# Patient Record
Sex: FEMALE | Race: WHITE | NOT HISPANIC OR LATINO | Employment: OTHER
[De-identification: names, ages, dates, MRNs, and addresses within clinical notes are randomized per-mention and may not be internally consistent; named-entity substitution may affect disease eponyms.]

---

## 2017-07-01 ENCOUNTER — HEALTH MAINTENANCE LETTER (OUTPATIENT)
Age: 27
End: 2017-07-01

## 2019-01-06 ENCOUNTER — TRANSFERRED RECORDS (OUTPATIENT)
Dept: HEALTH INFORMATION MANAGEMENT | Facility: CLINIC | Age: 29
End: 2019-01-06

## 2019-05-14 ENCOUNTER — NURSE TRIAGE (OUTPATIENT)
Dept: NURSING | Facility: CLINIC | Age: 29
End: 2019-05-14

## 2019-05-14 NOTE — TELEPHONE ENCOUNTER
"Reports she called to make appt w/ a cardiologist.  States her PCP is Dr. Abdoulaye Ceja \"at  clinic on [address]\". No [facility] clinic in that location and no record of visit since 2015 in [facility] system. Says \"my BP is really high\" but has not taken her BP \"but I can tell\" Sx are palpitations, intermittent SOB (sometimes improves w/asthms rescue inhaler but not always), intermittent chest pain (not currently), red hands and feet. Sx present 2 mos. Takes metoprolol for BP. Pt doubled her dose of this on her own to \"try to get my BP down\". This has not resulted in any improvement. Currently mild SOB, has not used inhaler. Advised ED now - first use inhaler or take on the way to hospital. Ana Garcia RN/FNA      Reason for Disposition    Difficulty breathing    Additional Information    Negative: Passed out (i.e., lost consciousness, collapsed and was not responding)    Negative: Shock suspected (e.g., cold/pale/clammy skin, too weak to stand, low BP, rapid pulse)    Negative: Difficult to awaken or acting confused (e.g., disoriented, slurred speech)    Negative: Visible sweat on face or sweat dripping down face    Negative: Unable to walk, or can only walk with assistance (e.g., requires support)    Negative: [1] Received SHOCK from implantable cardiac defibrillator AND [2] persisting symptoms (i.e., palpitations, lightheadedness)    Negative: Sounds like a life-threatening emergency to the triager    Negative: Chest pain    Protocols used: HEART RATE AND HEARTBEAT [id number]-BY      "

## 2019-12-27 LAB
ABO + RH BLD: NORMAL
ABO + RH BLD: NORMAL
HBV SURFACE AG SERPL QL IA: NONREACTIVE
HIV 1+2 AB+HIV1 P24 AG SERPL QL IA: NONREACTIVE
RUBELLA ANTIBODY IGG QUANTITATIVE: NORMAL IU/ML
TREPONEMA ANTIBODIES: NONREACTIVE

## 2020-01-06 ENCOUNTER — MEDICAL CORRESPONDENCE (OUTPATIENT)
Dept: HEALTH INFORMATION MANAGEMENT | Facility: CLINIC | Age: 30
End: 2020-01-06

## 2020-01-06 ENCOUNTER — TRANSFERRED RECORDS (OUTPATIENT)
Dept: HEALTH INFORMATION MANAGEMENT | Facility: CLINIC | Age: 30
End: 2020-01-06

## 2020-01-07 ENCOUNTER — TELEPHONE (OUTPATIENT)
Dept: MATERNAL FETAL MEDICINE | Facility: CLINIC | Age: 30
End: 2020-01-07

## 2020-01-07 ENCOUNTER — TRANSCRIBE ORDERS (OUTPATIENT)
Dept: MATERNAL FETAL MEDICINE | Facility: CLINIC | Age: 30
End: 2020-01-07

## 2020-01-07 DIAGNOSIS — O26.90 PREGNANCY RELATED CONDITION, ANTEPARTUM: Primary | ICD-10-CM

## 2020-01-07 NOTE — TELEPHONE ENCOUNTER
Spoke with Yamilet today to inquire if she has a Rhuematologist. Pt states she sees someone in [place], RACHELE sent to pt. Informed pt, once we receive records we will call to schedule. Pt VU.   Natali Hastings RN

## 2020-01-08 ENCOUNTER — TRANSCRIBE ORDERS (OUTPATIENT)
Dept: MATERNAL FETAL MEDICINE | Facility: CLINIC | Age: 30
End: 2020-01-08

## 2020-01-08 DIAGNOSIS — O26.90 PREGNANCY RELATED CONDITION, ANTEPARTUM: Primary | ICD-10-CM

## 2020-01-13 ENCOUNTER — PRE VISIT (OUTPATIENT)
Dept: MATERNAL FETAL MEDICINE | Facility: CLINIC | Age: 30
End: 2020-01-13

## 2020-01-14 NOTE — PROGRESS NOTES
"RE: Yamilet Vazquez  : 1990  MRUN: [id number]    2020    Dear Dr. Toscano,    Thank you for referring your patient Ms. Vazquez for a Maternal-Fetal Medicine consultation today.  As you know, she is a 29 year old  at 12 weeks and 0 days gestation with an estimated date of delivery of 2020 by 7 week ultrasound (uncertain LMP).  She came to me today to discuss recommendations as she has SSA antibodies in the setting of Sjogren syndrome.  She also has chronic hypertension and a history of depression with hospitalization for suicidality.  Today she feels well.  She denies contractions, leakage of fluid and vaginal bleeding.      She reports she was diagnosed with Sjogren syndrome about 2 years ago.  As per her, antibody testing was done without her having any symptoms. She was on plaquenil for a brief period of time and has been on nothing since. She reports she was also diagnosed with chronic hypertension two years ago.  She was previously on metoprolol, but is currently on nothing.  She is currently undergoing a work up for palpitations with a Holter monitor having seen a cardiologist at [facility] earlier today.  She has a history of hospitalization in  for suicidal ideation at which time she was 15 years-old as well as ER visits.  She has been on multiple medications in the past and is currently on fluoxetine.  With regards to her asthma she has never been hospitalized and has not used an inhaler for \"a long time.\"  Lastly, she had a seizure disorder when she was child, but has not had any seizures since she was about 14 and has been off medication for years.      Obstetrical History:    # Outcome Date GA Lbr Mane/2nd Weight Sex Delivery Anes PTL Lv   3 Current            2 SAB 19 4w0d          1 TAB 09 17w0d            Gynecological History:  - Reports + HPV, but not the high risk subtype  - Denies any history of abnormal pap smears  - Denies prior cervical surgery " or procedures  - Denies any history of frequent UTIs, vaginal infections, or STIs    Medical History:   Diagnosis Date     Acute otitis media 4/19/2013     ADHD      Anxiety      Asthma      Chronic hypertension      Depressive disorder      History of seizures as a child      Obesity      Psoriasis      Sjogren's syndrome (H)      SS-A antibody positive      Surgical History:   Procedure Laterality Date     AS INDUCED ABORTN BY DIL/EVAC  2009     TONSILLECTOMY, ADENOIDECTOMY, MYRINGOTOMY, INSERT TUBE BILATERAL, COMBINED  1996     Medications:   Fluoxetine 20 mg daily  Prenatal vitamin daily    Allergies:   Allergen Reactions     Flagyl [Metronidazole Hcl]      Swollen tongue hard to breath     Social History:    She  reports that she has quit smoking. She does not drink alcohol or use illicit drugs.    Family History:     She denies a family history of motor/intellectual impairment, stillbirth, genetic or chromosome abnormalities or congenital anomalies.       Review of Systems:    Negative except as stated in the HPI    Data Reviewed:      Current - 139/87    Pre-pregnancy - Height 5 feet 1 inches; Weight: 78.9 kg (174 lb); BMI:32.89 kg/m2    December, 2019  o ABO Group: O, Rh type: positive, antibody screen: negative  o Hemoglobin 14.3 mg/dL, hematocrit 41.8 %, MCV 92 fL, platelets 237 thou/ L  o TPA/HepBsAg/HIV: negative  o Rubella IgG: immune  o Gonorrhea/Chlamydia: negative  o Urine culture: negative  o Pro/Cr: 0.09  o Creatinine: 0.51 mg/dL  o Transaminases: 21/17 U/L    The remaining prenatal laboratory results are not available for the review during the consultation.    Ultrasound:    Yamilet requested a fetal heart rate check which was unsuccessful with the bedside Doptone and therefore an ultrasound was performed    Please see separate report/imaging tab for viability ultrasound performed today    Physical examination was deferred at this time.    In light of the patient s history as listed above my  recommendations can be summarized briefly as follows:    Sjogren Syndrome with SSA Antibodies    Sjogren disease may occur alone or in a secondary form associated with other disorders (such as rheumatoid arthritis).  While it is characterized by exocrine gland dysfunction, it may also involve many other organ systems.  Women with primary Sjogren tend to do very well during pregnancy.  Women with SS-A (Ro) antibodies are at risk for  lupus, characterized by rash and congenital heart block (CHB). The risk of developing heart block in offspring of women with anti-Ro/SSA antibodies in the absence of a prior birth with  lupus syndrome is approximately 1-2% and the risk for  lupus is about 4 to 16 percent in offspring of SSA positive mothers.   lupus can develop regardless of the clinical diagnosis whether it was SLE, Sjogren, or arthritis, as the development is linked to the passage of those antibodies. Presentation can be cutaneous (rash), cardiac (heart block), hepatic (abnormal LFTs), or hematologic (cytopenia).  We discussed the presentation of heart block can be in the form of 1st, 2nd, or 3rd degree block and that it most commonly occurs between 18 and 24 weeks. Prenatal screening programs using the mechanical MS interval have been created to identify heart block early so that the pregnancy management can be altered.      Chronic Hypertension    The concern with chronic hypertension is that such patients are more likely to develop preeclampsia during the pregnancy, with a risk of 20-50%.  Women with chronic hypertension are at increased risk for early-onset preeclampsia.  Low dose aspirin has been used to lower this risk.  Chronic hypertension also increases the risk of maternal stroke, pulmonary edema, renal failure, gestational diabetes, iatrogenic  birth, fetal growth restriction, placental abruption and  mortality rate including stillbirth.  We reviewed that it is  generally anticipated that blood pressure will gradually decrease during early pregnancy, reaching at gabriela at 28-32 weeks, and then slowly rise to pre-pregnancy levels.     Guidelines suggest starting antihypertensive medication in women with chronic hypertension if the systolic blood pressure is persistently 160 mmHg or higher or the diastolic blood pressure is persistently 110 mmHg or higher.  If there is evidence of end organ damage (renal insufficiency, left ventricular hypertrophy or severe thrombocytopenia) a lower threshold of 150 mmHg systolic and/or 100 mm Hg diastolic is recommended. Treatment with antihypertensives is generally used to maintain blood pressure in the general range of 120-160 mmHg systolic and  mmHg diastolic. Lower blood pressures may increase the risk of fetal growth restriction. The primary reason for antihypertensive is to reduce the risk of maternal stroke. Recommended antihypertensives with studied safety profiles in pregnancy include nifedipine and labetalol. Unfortunately, even exquisite control of blood pressure does not reduce the risk of superimposed preeclampsia.     Depression  Approximately 10-15% of women of reproductive age are affected by depression.  Untreated psychiatric disease in pregnancy is serious; approximately 40-50% of untreated patients will have an exacerbation and 15% are at risk for suicidal ideation.  Moreover, untreated depression may increase the risk of stillbirth, IUGR,  delivery, low Apgar scores, sexually transmitted infections, and substance abuse.  In general the risk of uncontrolled psychiatric disease outweighs the risk of medications during pregnancy, and therefore medications should be continued if medically indicated.  There is an extensive literature regarding the risks of medications, particularly SSRIs, in pregnancy.  SSRI s have been linked with an increased risk for primary pulmonary hypertension of the , though the  absolute risk of this disease is still low. Persistent pulmonary hypertension, which occurs in 2 per 1000 live births, occurs in about one per 100 newborns exposed to an SSRI in the second half of pregnancy, possibly related to serotonin-related effects on cardiovascular development.  The reports of  adaptation (or withdrawal) syndrome have not been clarified to this point.       Recommendations:    Every other week assessment for fetal heart block  - 16, 20 and 24 weeks with pediatric cardiology  - 18, 22 and 26 weeks with MFM - growth and anatomy will be assessed at these visits as well     EKG    Notification of pediatrics at delivery      Initiation of necessary medications and titrate as needed to achieve blood pressure goal    Low dose aspirin for preeclampsia prevention, ideally started between 12-16 weeks, Yamilet was instructed to start this    Baseline studies:   o Electrocardiogram, if abnormal this should be followed up with an echocardiogram  o Early GCT    Close monitoring for evidence of superimposed preeclampsia  o Repeat labs as clinically indicated, with a low threshold to rule-out superimposed preeclampsia, especially if it is thought that medication may need to be increased  o Close monitoring of maternal blood pressure in office and with home blood pressure monitoring, the patient's blood pressure cuff should be titrated in the office  o Signs and symptoms of preeclampsia reviewed    Ultrasounds for growth at 30 and 34 weeks with [facility]     fetal surveillance with weekly BPP (or NST and MVP) starting at 32-36 weeks depending on severity of maternal disease    Delivery at 38-39 weeks (unless poorly controlled or otherwise indicated sooner)    Early postpartum visit (within the first week) for blood pressure assessment as well as mood check    Postpartum, medication should be adjusted to maintain the systolic blood pressure below 150 mm Hg and the diastolic blood  pressure below 100 mm Hg    Close monitoring of depressive symptoms and follow up with her psychiatric provider throughout pregnancy.      Monitor total weight gain.      Thank you for allowing us to participate in the care of your patient. Please do not hesitate to contact us if you have further questions regarding the management of your patient.     Seen with and staffed by Dr. Raymond Nascimento MD  Maternal Fetal Medicine Fellow  1/16/2020 9:22 AM      MFM Attending Attestation     At the end of our discussion, Ms. Vazquez indicated that her questions were answered and she seemed satisfied with our discussion.  Thank you for the opportunity to participate in your patient s care.  If I can be of any further assistance, please do not hesitate to contact me.    I have seen and evaluated the patient myself. I reviewed her imaging/labs and chart since the last MFM visit.  I spent a total of 30 minutes face-to-face with Yamilet Vazquez during today's office visit. Over 50% of this time was spent counseling the patient and/or coordinating care regarding SSA antibodies/cHTN in pregnancy. See note for details; I have made the necessary edits/additions.      Radha Andrade MD  , OB/GYN  Maternal-Fetal Medicine  [email or web address]  [phone] (Academic office)  [phone] (Pager)

## 2020-01-16 ENCOUNTER — HOSPITAL ENCOUNTER (OUTPATIENT)
Dept: ULTRASOUND IMAGING | Facility: CLINIC | Age: 30
Discharge: HOME | End: 2020-01-16
Attending: OBSTETRICS & GYNECOLOGY | Admitting: OBSTETRICS & GYNECOLOGY
Payer: COMMERCIAL

## 2020-01-16 ENCOUNTER — OFFICE VISIT (OUTPATIENT)
Dept: MATERNAL FETAL MEDICINE | Facility: CLINIC | Age: 30
End: 2020-01-16
Attending: OBSTETRICS & GYNECOLOGY
Payer: COMMERCIAL

## 2020-01-16 VITALS
SYSTOLIC BLOOD PRESSURE: 139 MMHG | HEART RATE: 92 BPM | DIASTOLIC BLOOD PRESSURE: 87 MMHG | RESPIRATION RATE: 20 BRPM | OXYGEN SATURATION: 98 %

## 2020-01-16 DIAGNOSIS — O10.019 PRE-EXISTING ESSENTIAL HYPERTENSION DURING PREGNANCY, ANTEPARTUM: ICD-10-CM

## 2020-01-16 DIAGNOSIS — O26.90 PREGNANCY RELATED CONDITION, ANTEPARTUM: ICD-10-CM

## 2020-01-16 DIAGNOSIS — O99.340 DEPRESSION AFFECTING PREGNANCY: ICD-10-CM

## 2020-01-16 DIAGNOSIS — O99.212 OBESITY AFFECTING PREGNANCY IN SECOND TRIMESTER: ICD-10-CM

## 2020-01-16 DIAGNOSIS — F32.A DEPRESSION AFFECTING PREGNANCY: ICD-10-CM

## 2020-01-16 DIAGNOSIS — R76.8 SS-A ANTIBODY POSITIVE: Primary | ICD-10-CM

## 2020-01-16 PROCEDURE — 76801 OB US < 14 WKS SINGLE FETUS: CPT

## 2020-01-16 ASSESSMENT — PAIN SCALES - GENERAL: PAINLEVEL: NO PAIN (0)

## 2020-01-16 NOTE — NURSING NOTE
Yamilet here for mFm consult due to preg c/b Sjogren's +SSA Patient asking if she is having an U/S today. Pt very nervous about pregnancy. FHT's attempted to be doptoned and unsuccessful. Viability U/S done and +'s. Dr. Andrade and Dr. Nascimento in to see pt. See consult note. Pt to have echo with peds cards at 16, 20, and 24 weeks. Comp US with MD Intervals at 18, 22, amd 26 weeks. Radha Guerra, RN

## 2020-02-18 ENCOUNTER — HOSPITAL ENCOUNTER (OUTPATIENT)
Dept: CARDIOLOGY | Facility: CLINIC | Age: 30
Discharge: HOME | End: 2020-02-18
Attending: OBSTETRICS & GYNECOLOGY | Admitting: OBSTETRICS & GYNECOLOGY
Payer: COMMERCIAL

## 2020-02-18 DIAGNOSIS — R76.8 SS-A ANTIBODY POSITIVE: ICD-10-CM

## 2020-02-18 DIAGNOSIS — O26.90 PREGNANCY RELATED CONDITION, ANTEPARTUM: ICD-10-CM

## 2020-02-18 PROCEDURE — 76825 ECHO EXAM OF FETAL HEART: CPT

## 2020-02-18 NOTE — PROGRESS NOTES
[facility]   Heart Center Fetal Consult Note    Patient:  Yamilet Vazquez MRN:  [id number]   YOB: 1990 Age:  29 year old   Date of Visit:  2020 PCP:  Abdoulaye Ceja MD     Dear Dr. Andrade:    I had the pleasure of seeing Yamilet Vazquez at the [facility] on 2020 in [facility] fetal cardiology consultation for fetal echocardiogram. She presented today accompanied by herself. As you know, she is a 29 year old  at 16w5d who presented for fetal echocardiogram today because of +SSA antibodies.    I performed and interpreted the fetal echocardiogram today, which demonstrated likely normal fetal echocardiogram. Normal fetal intracardiac connections. Normal right and left ventricular size and function. Normal [place] interval. Fetal heart rate is regular at 150 bpm. No right or left ventricular MAGNO. No evidence of hydrops. The aortic isthmus was not well visualized on this study; no indirect evidence of coarctation. Difficult study due to fetal position and poor imaging windows.     I reviewed the echo findings today with Yamilet Vazquez. The results of the fetal echocardiogram were explained. Although this was a technically difficult study, the patient is aware that no obvious cardiac abnormalities were identified. She is aware of the general limitations of fetal echocardiography. A follow-up fetal echocardiogram is scheduled on 3- due to SSA+ antibodies . Post- EKG is recommended.     Plan:    1. Follow up Fetal Echo on 3/13/2020 already scheduled  2.  EKG is recommended.     Thank you for allowing me to participate in Yamilet's care. Please do not hesitate to contact me with questions or concerns.    This visit was separate from the performance and interpretation of the ultrasound. The majority of the time (>50%) was spent in counseling and coordination of care. I spent approximately 15 minutes in face-to-face  time reviewing the above considerations.    Jacobo Koehler M.D.  Pediatric Cardiology  [facility] [id number] [facility], 5th floor, [facility] [address]  Phone [phone]  Fax [phone]

## 2020-02-27 ENCOUNTER — HOSPITAL ENCOUNTER (OUTPATIENT)
Dept: ULTRASOUND IMAGING | Facility: CLINIC | Age: 30
Discharge: HOME | End: 2020-02-27
Attending: OBSTETRICS & GYNECOLOGY | Admitting: OBSTETRICS & GYNECOLOGY
Payer: COMMERCIAL

## 2020-02-27 ENCOUNTER — OFFICE VISIT (OUTPATIENT)
Dept: MATERNAL FETAL MEDICINE | Facility: CLINIC | Age: 30
End: 2020-02-27
Attending: OBSTETRICS & GYNECOLOGY
Payer: COMMERCIAL

## 2020-02-27 DIAGNOSIS — R76.8 SS-A ANTIBODY POSITIVE: ICD-10-CM

## 2020-02-27 DIAGNOSIS — O09.899 SINGLE UMBILICAL ARTERY AFFECTING MANAGEMENT OF MOTHER IN SINGLETON PREGNANCY, ANTEPARTUM: Primary | ICD-10-CM

## 2020-02-27 DIAGNOSIS — O26.90 PREGNANCY RELATED CONDITION, ANTEPARTUM: ICD-10-CM

## 2020-02-27 PROCEDURE — 76811 OB US DETAILED SNGL FETUS: CPT

## 2020-02-27 NOTE — PROGRESS NOTES
Please see full imaging report from View[employer] program under imaging tab.    Mamadou French MD  Maternal Fetal Medicine

## 2020-03-13 ENCOUNTER — HOSPITAL ENCOUNTER (OUTPATIENT)
Dept: CARDIOLOGY | Facility: CLINIC | Age: 30
Discharge: HOME | End: 2020-03-13
Attending: OBSTETRICS & GYNECOLOGY | Admitting: OBSTETRICS & GYNECOLOGY
Payer: COMMERCIAL

## 2020-03-13 DIAGNOSIS — O26.90 PREGNANCY RELATED CONDITION, ANTEPARTUM: ICD-10-CM

## 2020-03-13 DIAGNOSIS — R76.8 SS-A ANTIBODY POSITIVE: ICD-10-CM

## 2020-03-13 PROCEDURE — 76826 ECHO EXAM OF FETAL HEART: CPT

## 2020-03-26 ENCOUNTER — HOSPITAL ENCOUNTER (OUTPATIENT)
Dept: ULTRASOUND IMAGING | Facility: CLINIC | Age: 30
End: 2020-03-26
Attending: OBSTETRICS & GYNECOLOGY
Payer: COMMERCIAL

## 2020-03-26 ENCOUNTER — OFFICE VISIT (OUTPATIENT)
Dept: MATERNAL FETAL MEDICINE | Facility: CLINIC | Age: 30
End: 2020-03-26
Attending: OBSTETRICS & GYNECOLOGY
Payer: COMMERCIAL

## 2020-03-26 DIAGNOSIS — O26.90 PREGNANCY, ANTEPARTUM, COMPLICATIONS: Primary | ICD-10-CM

## 2020-03-26 DIAGNOSIS — R76.8 SS-A ANTIBODY POSITIVE: ICD-10-CM

## 2020-03-26 DIAGNOSIS — O26.90 PREGNANCY RELATED CONDITION, ANTEPARTUM: ICD-10-CM

## 2020-03-26 DIAGNOSIS — O10.019 PRE-EXISTING ESSENTIAL HYPERTENSION DURING PREGNANCY, ANTEPARTUM: ICD-10-CM

## 2020-03-26 PROCEDURE — 76816 OB US FOLLOW-UP PER FETUS: CPT

## 2020-03-26 NOTE — PROGRESS NOTES
"Please see \"Imaging\" tab under \"Chart Review\" for details of today's visit.    Randell Gifford    "

## 2020-03-26 NOTE — NURSING NOTE
Patient presents to [facility] for Growth US secondary to Sjogren's, 2VC and CHTN. When discussing BP's, patient states she hasn't had it checked in a while and hasn't been to her OB in over a month. We discussed having regular OB visits with her primary care and we would continue to follow her for US's. Patient understands plan and was encouraged to call primary for OB appointment. Patient is scheduled for follow up fetal echo with Peds Cards on 4/10/20 and RL2 with M on 4/23/20.    Marcia Morton RN    Routed to primary OB.

## 2020-04-03 ENCOUNTER — TELEPHONE (OUTPATIENT)
Dept: PEDIATRIC CARDIOLOGY | Facility: CLINIC | Age: 30
End: 2020-04-03

## 2020-04-03 NOTE — TELEPHONE ENCOUNTER
Left VM for patient to call back to discuss upcoming fetal appointment on 4/10. Fetal cardiology is recommending postponing 2-3 weeks. Awaiting call back.

## 2020-04-23 ENCOUNTER — HOSPITAL ENCOUNTER (OUTPATIENT)
Dept: ULTRASOUND IMAGING | Facility: CLINIC | Age: 30
End: 2020-04-23
Attending: OBSTETRICS & GYNECOLOGY
Payer: COMMERCIAL

## 2020-04-23 ENCOUNTER — OFFICE VISIT (OUTPATIENT)
Dept: MATERNAL FETAL MEDICINE | Facility: CLINIC | Age: 30
End: 2020-04-23
Attending: OBSTETRICS & GYNECOLOGY
Payer: COMMERCIAL

## 2020-04-23 DIAGNOSIS — O09.899 SINGLE UMBILICAL ARTERY AFFECTING MANAGEMENT OF MOTHER IN SINGLETON PREGNANCY, ANTEPARTUM: Primary | ICD-10-CM

## 2020-04-23 DIAGNOSIS — R76.8 SS-A ANTIBODY POSITIVE: ICD-10-CM

## 2020-04-23 DIAGNOSIS — O26.90 PREGNANCY RELATED CONDITION, ANTEPARTUM: ICD-10-CM

## 2020-04-23 PROCEDURE — 76816 OB US FOLLOW-UP PER FETUS: CPT

## 2020-05-21 ENCOUNTER — TRANSCRIBE ORDERS (OUTPATIENT)
Dept: MATERNAL FETAL MEDICINE | Facility: CLINIC | Age: 30
End: 2020-05-21

## 2020-05-21 DIAGNOSIS — O26.90 PREGNANCY RELATED CONDITION: Primary | ICD-10-CM

## 2020-05-26 ENCOUNTER — OFFICE VISIT (OUTPATIENT)
Dept: MATERNAL FETAL MEDICINE | Facility: CLINIC | Age: 30
End: 2020-05-26
Attending: OBSTETRICS & GYNECOLOGY
Payer: COMMERCIAL

## 2020-05-26 ENCOUNTER — HOSPITAL ENCOUNTER (OUTPATIENT)
Dept: ULTRASOUND IMAGING | Facility: CLINIC | Age: 30
End: 2020-05-26
Attending: OBSTETRICS & GYNECOLOGY
Payer: COMMERCIAL

## 2020-05-26 DIAGNOSIS — O09.899 SINGLE UMBILICAL ARTERY AFFECTING MANAGEMENT OF MOTHER IN SINGLETON PREGNANCY, ANTEPARTUM: Primary | ICD-10-CM

## 2020-05-26 DIAGNOSIS — O26.90 PREGNANCY RELATED CONDITION: ICD-10-CM

## 2020-05-26 DIAGNOSIS — O10.919 CHRONIC HYPERTENSION AFFECTING PREGNANCY: ICD-10-CM

## 2020-05-26 DIAGNOSIS — O26.90 PREGNANCY, ANTEPARTUM, COMPLICATIONS: ICD-10-CM

## 2020-05-26 PROCEDURE — 76816 OB US FOLLOW-UP PER FETUS: CPT

## 2020-06-01 ENCOUNTER — HOSPITAL ENCOUNTER (OUTPATIENT)
Facility: CLINIC | Age: 30
End: 2020-06-01
Admitting: OBSTETRICS & GYNECOLOGY
Payer: COMMERCIAL

## 2020-06-01 ENCOUNTER — HOSPITAL ENCOUNTER (OUTPATIENT)
Facility: CLINIC | Age: 30
Discharge: HOME | End: 2020-06-01
Attending: OBSTETRICS & GYNECOLOGY | Admitting: OBSTETRICS & GYNECOLOGY
Payer: COMMERCIAL

## 2020-06-01 VITALS
BODY MASS INDEX: 37.76 KG/M2 | WEIGHT: 200 LBS | SYSTOLIC BLOOD PRESSURE: 108 MMHG | RESPIRATION RATE: 18 BRPM | HEIGHT: 61 IN | TEMPERATURE: 97.5 F | DIASTOLIC BLOOD PRESSURE: 73 MMHG

## 2020-06-01 PROBLEM — Z36.89 ENCOUNTER FOR TRIAGE IN PREGNANT PATIENT: Status: ACTIVE | Noted: 2020-06-01

## 2020-06-01 LAB
ALT SERPL W P-5'-P-CCNC: 22 U/L (ref 0–50)
AST SERPL W P-5'-P-CCNC: 21 U/L (ref 0–45)
CREAT SERPL-MCNC: 0.42 MG/DL (ref 0.52–1.04)
CREAT UR-MCNC: 44 MG/DL
ERYTHROCYTE [DISTWIDTH] IN BLOOD BY AUTOMATED COUNT: 13 % (ref 10–15)
GFR SERPL CREATININE-BSD FRML MDRD: >90 ML/MIN/{1.73_M2}
HCT VFR BLD AUTO: 33.1 % (ref 35–47)
HGB BLD-MCNC: 11.3 G/DL (ref 11.7–15.7)
MCH RBC QN AUTO: 31.1 PG (ref 26.5–33)
MCHC RBC AUTO-ENTMCNC: 34.1 G/DL (ref 31.5–36.5)
MCV RBC AUTO: 91 FL (ref 78–100)
PLATELET # BLD AUTO: 208 10E9/L (ref 150–450)
PROT UR-MCNC: 0.13 G/L
PROT/CREAT 24H UR: 0.29 G/G CR (ref 0–0.2)
RBC # BLD AUTO: 3.63 10E12/L (ref 3.8–5.2)
WBC # BLD AUTO: 6.3 10E9/L (ref 4–11)

## 2020-06-01 PROCEDURE — 84460 ALANINE AMINO (ALT) (SGPT): CPT | Performed by: MIDWIFE

## 2020-06-01 PROCEDURE — 85027 COMPLETE CBC AUTOMATED: CPT | Performed by: MIDWIFE

## 2020-06-01 PROCEDURE — 36415 COLL VENOUS BLD VENIPUNCTURE: CPT | Performed by: MIDWIFE

## 2020-06-01 PROCEDURE — G0463 HOSPITAL OUTPT CLINIC VISIT: HCPCS | Mod: 25

## 2020-06-01 PROCEDURE — 84156 ASSAY OF PROTEIN URINE: CPT | Performed by: MIDWIFE

## 2020-06-01 PROCEDURE — 59025 FETAL NON-STRESS TEST: CPT

## 2020-06-01 PROCEDURE — 82565 ASSAY OF CREATININE: CPT | Performed by: MIDWIFE

## 2020-06-01 PROCEDURE — 84450 TRANSFERASE (AST) (SGOT): CPT | Performed by: MIDWIFE

## 2020-06-01 ASSESSMENT — MIFFLIN-ST. JEOR: SCORE: 1559.57

## 2020-06-01 NOTE — DISCHARGE INSTRUCTIONS
Discharge Instruction for Undelivered Patients      You were seen for: Fetal Assessment and blood pressure check  We Consulted: Dr Ibarra  You had (Test or Medicine):labs, fetal monitoring     Diet:   Drink 8 to 12 glasses of liquids (milk, juice, water) every day.  You may eat meals and snacks.     Activity:  Call your doctor or nurse midwife if your baby is moving less than usual.     Call your provider if you notice:  Swelling in your face or increased swelling in your hands or legs.  Headaches that are not relieved by Tylenol (acetaminophen).  Changes in your vision (blurring: seeing spots or stars.)  Nausea (sick to your stomach) and vomiting (throwing up).   Weight gain of 5 pounds or more per week.  Heartburn that doesn't go away.  Signs of bladder infection: pain when you urinate (use the toilet), need to go more often and more urgently.  The bag of jalloh (rupture of membranes) breaks, or you notice leaking in your underwear.  Bright red blood in your underwear.  Abdominal (lower belly) or stomach pain.  For first baby: Contractions (tightening) less than 5 minutes apart for one hour or more.  Second (plus) baby: Contractions (tightening) less than 10 minutes apart and getting stronger.  *If less than 34 weeks: Contractions (tightenings) more than 6 times in one hour.  Increase or change in vaginal discharge (note the color and amount)  Other: Please follow up in clinic on Thursday, June 4th.    Follow-up:  Make an appointment to be seen on Thurs, June 4th

## 2020-06-01 NOTE — PLAN OF CARE
Pt is  who arrived due to decreased FM and stated that her blood p[ressure was elevated at home, 150's/80's.  Pt stated that she has a headache but also has been having tension headaches throughout pregnancy.  Feels the same.  Denies epigastric pain, blurred vision or any other symptoms.  No Leaking of fluid per pt.    Monitors placed after obtaining verbal consent.   BP's 110-120/80's. Pt oriented to room and given call light.  Urine sample obtained.  Dr Castillo notified of pt status. Awaiting lab results.   Will continue to monitor.

## 2020-06-01 NOTE — PLAN OF CARE
Pt discharged to home undelivered.  Discharge instructions given and pt voiced understanding.  Pt to follow up in clinic on Thursday per Dr Castillo but pt states she has an appointment tomorrow that she will keep.  All questions answered and pt left ambulatory.

## 2020-06-01 NOTE — PLAN OF CARE
Dr Castillo notified of pt lab results and category 1 tracing.  Discharge to home per Dr Castillo.

## 2020-07-01 LAB — GROUP B STREP PCR: NEGATIVE

## 2020-07-14 DIAGNOSIS — Z34.90 ENCOUNTER FOR INDUCTION OF LABOR: ICD-10-CM

## 2020-07-14 PROCEDURE — U0003 INFECTIOUS AGENT DETECTION BY NUCLEIC ACID (DNA OR RNA); SEVERE ACUTE RESPIRATORY SYNDROME CORONAVIRUS 2 (SARS-COV-2) (CORONAVIRUS DISEASE [COVID-19]), AMPLIFIED PROBE TECHNIQUE, MAKING USE OF HIGH THROUGHPUT TECHNOLOGIES AS DESCRIBED BY CMS-2020-01-R: HCPCS | Performed by: OBSTETRICS & GYNECOLOGY

## 2020-07-14 PROCEDURE — 99207 ZZC NO BILLABLE SERVICE THIS VISIT: CPT | Performed by: OBSTETRICS & GYNECOLOGY

## 2020-07-15 LAB
SARS-COV-2 RNA SPEC QL NAA+PROBE: NOT DETECTED
SPECIMEN SOURCE: NORMAL

## 2020-07-16 ENCOUNTER — HOSPITAL ENCOUNTER (INPATIENT)
Facility: CLINIC | Age: 30
LOS: 5 days | Discharge: HOME | End: 2020-07-21
Attending: OBSTETRICS & GYNECOLOGY | Admitting: OBSTETRICS & GYNECOLOGY
Payer: COMMERCIAL

## 2020-07-16 DIAGNOSIS — Z34.90 ENCOUNTER FOR INDUCTION OF LABOR: Primary | ICD-10-CM

## 2020-07-16 LAB
ALT SERPL W P-5'-P-CCNC: 17 U/L (ref 0–50)
ANION GAP SERPL CALCULATED.3IONS-SCNC: 10 MMOL/L (ref 3–14)
AST SERPL W P-5'-P-CCNC: 23 U/L (ref 0–45)
BASOPHILS # BLD AUTO: 0 10E9/L (ref 0–0.2)
BASOPHILS NFR BLD AUTO: 0.2 %
BUN SERPL-MCNC: 6 MG/DL (ref 7–30)
CALCIUM SERPL-MCNC: 8.3 MG/DL (ref 8.5–10.1)
CHLORIDE SERPL-SCNC: 105 MMOL/L (ref 94–109)
CO2 SERPL-SCNC: 22 MMOL/L (ref 20–32)
CREAT SERPL-MCNC: 0.52 MG/DL (ref 0.52–1.04)
DIFFERENTIAL METHOD BLD: NORMAL
EOSINOPHIL # BLD AUTO: 0 10E9/L (ref 0–0.7)
EOSINOPHIL NFR BLD AUTO: 0 %
ERYTHROCYTE [DISTWIDTH] IN BLOOD BY AUTOMATED COUNT: 13.4 % (ref 10–15)
GFR SERPL CREATININE-BSD FRML MDRD: >90 ML/MIN/{1.73_M2}
GLUCOSE SERPL-MCNC: 69 MG/DL (ref 70–99)
HCT VFR BLD AUTO: 35.8 % (ref 35–47)
HGB BLD-MCNC: 11.9 G/DL (ref 11.7–15.7)
IMM GRANULOCYTES # BLD: 0.1 10E9/L (ref 0–0.4)
IMM GRANULOCYTES NFR BLD: 1.1 %
LYMPHOCYTES # BLD AUTO: 2.1 10E9/L (ref 0.8–5.3)
LYMPHOCYTES NFR BLD AUTO: 25 %
MCH RBC QN AUTO: 31.3 PG (ref 26.5–33)
MCHC RBC AUTO-ENTMCNC: 33.2 G/DL (ref 31.5–36.5)
MCV RBC AUTO: 94 FL (ref 78–100)
MONOCYTES # BLD AUTO: 0.8 10E9/L (ref 0–1.3)
MONOCYTES NFR BLD AUTO: 9.2 %
NEUTROPHILS # BLD AUTO: 5.3 10E9/L (ref 1.6–8.3)
NEUTROPHILS NFR BLD AUTO: 64.5 %
NRBC # BLD AUTO: 0 10*3/UL
NRBC BLD AUTO-RTO: 0 /100
PLATELET # BLD AUTO: 188 10E9/L (ref 150–450)
POTASSIUM SERPL-SCNC: 3.1 MMOL/L (ref 3.4–5.3)
RBC # BLD AUTO: 3.8 10E12/L (ref 3.8–5.2)
SODIUM SERPL-SCNC: 137 MMOL/L (ref 133–144)
WBC # BLD AUTO: 8.2 10E9/L (ref 4–11)

## 2020-07-16 PROCEDURE — 84460 ALANINE AMINO (ALT) (SGPT): CPT | Performed by: OBSTETRICS & GYNECOLOGY

## 2020-07-16 PROCEDURE — 82565 ASSAY OF CREATININE: CPT | Performed by: OBSTETRICS & GYNECOLOGY

## 2020-07-16 PROCEDURE — 85025 COMPLETE CBC W/AUTO DIFF WBC: CPT | Performed by: OBSTETRICS & GYNECOLOGY

## 2020-07-16 PROCEDURE — 86870 RBC ANTIBODY IDENTIFICATION: CPT | Performed by: OBSTETRICS & GYNECOLOGY

## 2020-07-16 PROCEDURE — 86850 RBC ANTIBODY SCREEN: CPT | Performed by: OBSTETRICS & GYNECOLOGY

## 2020-07-16 PROCEDURE — 86900 BLOOD TYPING SEROLOGIC ABO: CPT | Performed by: OBSTETRICS & GYNECOLOGY

## 2020-07-16 PROCEDURE — 12000000 ZZH R&B MED SURG/OB

## 2020-07-16 PROCEDURE — 86901 BLOOD TYPING SEROLOGIC RH(D): CPT | Performed by: OBSTETRICS & GYNECOLOGY

## 2020-07-16 PROCEDURE — 84450 TRANSFERASE (AST) (SGOT): CPT | Performed by: OBSTETRICS & GYNECOLOGY

## 2020-07-16 PROCEDURE — 25000132 ZZH RX MED GY IP 250 OP 250 PS 637: Performed by: OBSTETRICS & GYNECOLOGY

## 2020-07-16 PROCEDURE — 86780 TREPONEMA PALLIDUM: CPT | Performed by: OBSTETRICS & GYNECOLOGY

## 2020-07-16 PROCEDURE — 36415 COLL VENOUS BLD VENIPUNCTURE: CPT | Performed by: OBSTETRICS & GYNECOLOGY

## 2020-07-16 PROCEDURE — 3E0P7VZ INTRODUCTION OF HORMONE INTO FEMALE REPRODUCTIVE, VIA NATURAL OR ARTIFICIAL OPENING: ICD-10-PCS | Performed by: OBSTETRICS & GYNECOLOGY

## 2020-07-16 PROCEDURE — 80048 BASIC METABOLIC PNL TOTAL CA: CPT | Performed by: OBSTETRICS & GYNECOLOGY

## 2020-07-16 RX ORDER — OXYTOCIN/0.9 % SODIUM CHLORIDE 30/500 ML
100-340 PLASTIC BAG, INJECTION (ML) INTRAVENOUS CONTINUOUS PRN
Status: COMPLETED | OUTPATIENT
Start: 2020-07-16 | End: 2020-07-19

## 2020-07-16 RX ORDER — CARBOPROST TROMETHAMINE 250 UG/ML
250 INJECTION, SOLUTION INTRAMUSCULAR
Status: DISCONTINUED | OUTPATIENT
Start: 2020-07-16 | End: 2020-07-19

## 2020-07-16 RX ORDER — ONDANSETRON 2 MG/ML
4 INJECTION INTRAMUSCULAR; INTRAVENOUS EVERY 6 HOURS PRN
Status: DISCONTINUED | OUTPATIENT
Start: 2020-07-16 | End: 2020-07-19

## 2020-07-16 RX ORDER — FLUOXETINE 10 MG/1
20 CAPSULE ORAL DAILY
COMMUNITY

## 2020-07-16 RX ORDER — FENTANYL CITRATE 50 UG/ML
50-100 INJECTION, SOLUTION INTRAMUSCULAR; INTRAVENOUS
Status: DISCONTINUED | OUTPATIENT
Start: 2020-07-16 | End: 2020-07-19

## 2020-07-16 RX ORDER — ASPIRIN 81 MG/1
81 TABLET ORAL DAILY
Status: ON HOLD | COMMUNITY
End: 2020-07-21

## 2020-07-16 RX ORDER — ACETAMINOPHEN 325 MG/1
650 TABLET ORAL EVERY 4 HOURS PRN
Status: DISCONTINUED | OUTPATIENT
Start: 2020-07-16 | End: 2020-07-19

## 2020-07-16 RX ORDER — HYDROXYZINE HYDROCHLORIDE 50 MG/1
100 TABLET, FILM COATED ORAL
Status: COMPLETED | OUTPATIENT
Start: 2020-07-16 | End: 2020-07-16

## 2020-07-16 RX ORDER — SODIUM CHLORIDE, SODIUM LACTATE, POTASSIUM CHLORIDE, CALCIUM CHLORIDE 600; 310; 30; 20 MG/100ML; MG/100ML; MG/100ML; MG/100ML
INJECTION, SOLUTION INTRAVENOUS CONTINUOUS
Status: DISCONTINUED | OUTPATIENT
Start: 2020-07-16 | End: 2020-07-19

## 2020-07-16 RX ORDER — TRANEXAMIC ACID 10 MG/ML
1 INJECTION, SOLUTION INTRAVENOUS EVERY 30 MIN PRN
Status: DISCONTINUED | OUTPATIENT
Start: 2020-07-16 | End: 2020-07-19

## 2020-07-16 RX ORDER — METHYLERGONOVINE MALEATE 0.2 MG/ML
200 INJECTION INTRAVENOUS
Status: DISCONTINUED | OUTPATIENT
Start: 2020-07-16 | End: 2020-07-19

## 2020-07-16 RX ORDER — IBUPROFEN 800 MG/1
800 TABLET, FILM COATED ORAL
Status: DISCONTINUED | OUTPATIENT
Start: 2020-07-16 | End: 2020-07-19

## 2020-07-16 RX ORDER — NALOXONE HYDROCHLORIDE 0.4 MG/ML
.1-.4 INJECTION, SOLUTION INTRAMUSCULAR; INTRAVENOUS; SUBCUTANEOUS
Status: DISCONTINUED | OUTPATIENT
Start: 2020-07-16 | End: 2020-07-19

## 2020-07-16 RX ORDER — MISOPROSTOL 100 UG/1
25 TABLET ORAL
Status: DISCONTINUED | OUTPATIENT
Start: 2020-07-16 | End: 2020-07-19

## 2020-07-16 RX ORDER — OXYCODONE AND ACETAMINOPHEN 5; 325 MG/1; MG/1
1 TABLET ORAL
Status: DISCONTINUED | OUTPATIENT
Start: 2020-07-16 | End: 2020-07-19

## 2020-07-16 RX ORDER — OXYTOCIN 10 [USP'U]/ML
10 INJECTION, SOLUTION INTRAMUSCULAR; INTRAVENOUS
Status: DISCONTINUED | OUTPATIENT
Start: 2020-07-16 | End: 2020-07-19

## 2020-07-16 RX ADMIN — Medication 25 MCG: at 21:44

## 2020-07-16 RX ADMIN — Medication 25 MCG: at 23:50

## 2020-07-16 RX ADMIN — HYDROXYZINE HYDROCHLORIDE 100 MG: 50 TABLET, FILM COATED ORAL at 23:50

## 2020-07-16 ASSESSMENT — MIFFLIN-ST. JEOR: SCORE: 1618.54

## 2020-07-17 ENCOUNTER — ANESTHESIA (OUTPATIENT)
Dept: OBGYN | Facility: CLINIC | Age: 30
End: 2020-07-17
Payer: COMMERCIAL

## 2020-07-17 ENCOUNTER — ANESTHESIA EVENT (OUTPATIENT)
Dept: OBGYN | Facility: CLINIC | Age: 30
End: 2020-07-17
Payer: COMMERCIAL

## 2020-07-17 LAB
ABO + RH BLD: ABNORMAL
ABO + RH BLD: ABNORMAL
BLD GP AB INVEST PLASRBC-IMP: ABNORMAL
BLD GP AB SCN SERPL QL: ABNORMAL
BLOOD BANK CMNT PATIENT-IMP: ABNORMAL
SPECIMEN EXP DATE BLD: ABNORMAL
T PALLIDUM AB SER QL: NONREACTIVE

## 2020-07-17 PROCEDURE — 25000132 ZZH RX MED GY IP 250 OP 250 PS 637: Performed by: OBSTETRICS & GYNECOLOGY

## 2020-07-17 PROCEDURE — 37000011 ZZH ANESTHESIA WARD SERVICE

## 2020-07-17 PROCEDURE — 40000671 ZZH STATISTIC ANESTHESIA CASE

## 2020-07-17 PROCEDURE — 12000000 ZZH R&B MED SURG/OB

## 2020-07-17 RX ORDER — EPHEDRINE SULFATE 50 MG/ML
5 INJECTION, SOLUTION INTRAMUSCULAR; INTRAVENOUS; SUBCUTANEOUS
Status: DISCONTINUED | OUTPATIENT
Start: 2020-07-17 | End: 2020-07-19

## 2020-07-17 RX ORDER — LIDOCAINE HYDROCHLORIDE AND EPINEPHRINE 15; 5 MG/ML; UG/ML
3 INJECTION, SOLUTION EPIDURAL
Status: DISCONTINUED | OUTPATIENT
Start: 2020-07-17 | End: 2020-07-19

## 2020-07-17 RX ORDER — HYDROXYZINE HYDROCHLORIDE 50 MG/1
50 TABLET, FILM COATED ORAL ONCE
Status: COMPLETED | OUTPATIENT
Start: 2020-07-17 | End: 2020-07-17

## 2020-07-17 RX ORDER — NALOXONE HYDROCHLORIDE 0.4 MG/ML
.1-.4 INJECTION, SOLUTION INTRAMUSCULAR; INTRAVENOUS; SUBCUTANEOUS
Status: DISCONTINUED | OUTPATIENT
Start: 2020-07-17 | End: 2020-07-19

## 2020-07-17 RX ORDER — NALBUPHINE HYDROCHLORIDE 20 MG/ML
2.5-5 INJECTION, SOLUTION INTRAMUSCULAR; INTRAVENOUS; SUBCUTANEOUS EVERY 6 HOURS PRN
Status: DISCONTINUED | OUTPATIENT
Start: 2020-07-17 | End: 2020-07-19

## 2020-07-17 RX ADMIN — Medication 25 MCG: at 01:51

## 2020-07-17 RX ADMIN — DINOPROSTONE 10 MG: 10 INSERT VAGINAL at 16:56

## 2020-07-17 RX ADMIN — Medication 25 MCG: at 06:00

## 2020-07-17 RX ADMIN — Medication 25 MCG: at 08:07

## 2020-07-17 RX ADMIN — Medication 25 MCG: at 03:48

## 2020-07-17 RX ADMIN — Medication 25 MCG: at 13:59

## 2020-07-17 RX ADMIN — HYDROXYZINE HYDROCHLORIDE 50 MG: 50 TABLET, FILM COATED ORAL at 23:20

## 2020-07-17 NOTE — PROVIDER NOTIFICATION
07/16/20 2115   Provider Notification   Provider Name/Title Dr. Salazar   Method of Notification Phone   Request Evaluate - Remote   Notification Reason Patient Arrived

## 2020-07-17 NOTE — PLAN OF CARE
Flako frequently, feels low abdominal cramping which radiates to her back, she prefers to be in sitting position for comfort

## 2020-07-17 NOTE — PLAN OF CARE
Patient rested well this night. VSS. Received 5 doses of PO cytotec and took 100mg Vistaril for sleep. Patient reports some mild cramping but declines need for pain medication. Cx 2-5 min apart. EFM Category 1 all shift. Appears comfortable. Plan to go ahead with induction today.

## 2020-07-17 NOTE — PLAN OF CARE
Problem: Change in Fetal Wellbeing (Labor)  Goal: Stable Fetal Wellbeing  7/17/2020 0622 by Carol Quiroz, RN  Outcome: No Change   FHR Category 1

## 2020-07-17 NOTE — H&P
"OB Brief Admit H&P    No significant change in general health status based on examination of the patient, review of Nursing Admission Database and prenatal record.    Pt is a 29 year old  @ 38w1d who presented to L&D for IOL due to CHTN.    Patient's prenatal course has been complicated by Hx of depression, Anti SSA positive, Rh neg, CHTN, asthma, failed early 1 hr GCT, normal 3 hour, passed 3rd tri 1 hr    Prenatal Labs:    Blood type O neg  Rubella immune    GBS neg    EFW: 7#    /59   Pulse 93   Temp 98.1  F (36.7  C) (Oral)   Resp 15   Ht 1.549 m (5' 1\")   Wt 96.6 kg (213 lb)   LMP 10/21/2019   BMI 40.25 kg/m    EFM:  Baseline 140 bpm, moderate variability, accels present, no decels  [place]: ctx q 2-4 min  SVE: 0/40/-3 per RN on admit  Membranes: intact    Assessment:  29 year old  @ 38w1d admitted for IOL for CHTN.    Labor: Cervical ripening due to unfavorable cervix overnight, s/p 6 doses PO cytotec  - Plan for AROM, pit once favorable  - Pain: planning epidural    FWB: Category I reactive  - 2 vessel cord   - continuous fetal monitoring    CHTN: baseline preE labs on admit WNL  - continue to monitor    Anti SSA positive: notify peds after delivery, will need  EKG    Prenatal:  Rh neg, will need Rhogam eval postpartum  GBS negative  Hx of depression - 2 weeks postpartum mood check  ASCUS/+HR HPV - needs colpo postpartum    Elysia Green MD  2020  7:58 AM      "

## 2020-07-17 NOTE — PLAN OF CARE
Patient arrived to L and D unit at 1945 for cervical ripening.  Denies leakage of fluid, vaginal bleeding, headache, epigastric pain, visual disturbances.  Fetal movement present.  External monitors applied.  Physical assessment and health history taken.  Admission questions answered and bill of rights reviewed.  PLAN:  Orders for cervical ripening, intrapartum admission from Dr. Salazar.

## 2020-07-17 NOTE — PLAN OF CARE
12:00 Eating and drinking adequate amounts, positioning in bed up to bathroom every 1-2 hours to void, feels like bad 'gas' pains, aching pains wrap around to her back, Category 1 tracing  1300 Has been able to doze on and off,drinking fluids adequately continues to contract every 2-3 minutes lasting a monute

## 2020-07-18 LAB
ALT SERPL W P-5'-P-CCNC: 16 U/L (ref 0–50)
AST SERPL W P-5'-P-CCNC: 21 U/L (ref 0–45)
CREAT UR-MCNC: 45 MG/DL
ERYTHROCYTE [DISTWIDTH] IN BLOOD BY AUTOMATED COUNT: 13.3 % (ref 10–15)
HCT VFR BLD AUTO: 36 % (ref 35–47)
HGB BLD-MCNC: 12.2 G/DL (ref 11.7–15.7)
MCH RBC QN AUTO: 31.8 PG (ref 26.5–33)
MCHC RBC AUTO-ENTMCNC: 33.9 G/DL (ref 31.5–36.5)
MCV RBC AUTO: 94 FL (ref 78–100)
PLATELET # BLD AUTO: 164 10E9/L (ref 150–450)
PROT UR-MCNC: 0.13 G/L
PROT/CREAT 24H UR: 0.3 G/G CR (ref 0–0.2)
RBC # BLD AUTO: 3.84 10E12/L (ref 3.8–5.2)
URATE SERPL-MCNC: 4.3 MG/DL (ref 2.6–6)
WBC # BLD AUTO: 7.9 10E9/L (ref 4–11)

## 2020-07-18 PROCEDURE — 85027 COMPLETE CBC AUTOMATED: CPT | Performed by: OBSTETRICS & GYNECOLOGY

## 2020-07-18 PROCEDURE — 84156 ASSAY OF PROTEIN URINE: CPT | Performed by: OBSTETRICS & GYNECOLOGY

## 2020-07-18 PROCEDURE — 25000125 ZZHC RX 250: Performed by: OBSTETRICS & GYNECOLOGY

## 2020-07-18 PROCEDURE — 00HU33Z INSERTION OF INFUSION DEVICE INTO SPINAL CANAL, PERCUTANEOUS APPROACH: ICD-10-PCS | Performed by: ANESTHESIOLOGY

## 2020-07-18 PROCEDURE — 25800030 ZZH RX IP 258 OP 636: Performed by: OBSTETRICS & GYNECOLOGY

## 2020-07-18 PROCEDURE — 84550 ASSAY OF BLOOD/URIC ACID: CPT | Performed by: OBSTETRICS & GYNECOLOGY

## 2020-07-18 PROCEDURE — 3E033VJ INTRODUCTION OF OTHER HORMONE INTO PERIPHERAL VEIN, PERCUTANEOUS APPROACH: ICD-10-PCS | Performed by: OBSTETRICS & GYNECOLOGY

## 2020-07-18 PROCEDURE — 3E0R3BZ INTRODUCTION OF ANESTHETIC AGENT INTO SPINAL CANAL, PERCUTANEOUS APPROACH: ICD-10-PCS | Performed by: ANESTHESIOLOGY

## 2020-07-18 PROCEDURE — 25000128 H RX IP 250 OP 636: Performed by: OBSTETRICS & GYNECOLOGY

## 2020-07-18 PROCEDURE — 36415 COLL VENOUS BLD VENIPUNCTURE: CPT | Performed by: OBSTETRICS & GYNECOLOGY

## 2020-07-18 PROCEDURE — 10907ZC DRAINAGE OF AMNIOTIC FLUID, THERAPEUTIC FROM PRODUCTS OF CONCEPTION, VIA NATURAL OR ARTIFICIAL OPENING: ICD-10-PCS | Performed by: OBSTETRICS & GYNECOLOGY

## 2020-07-18 PROCEDURE — 12000000 ZZH R&B MED SURG/OB

## 2020-07-18 PROCEDURE — 84450 TRANSFERASE (AST) (SGOT): CPT | Performed by: OBSTETRICS & GYNECOLOGY

## 2020-07-18 PROCEDURE — 84460 ALANINE AMINO (ALT) (SGPT): CPT | Performed by: OBSTETRICS & GYNECOLOGY

## 2020-07-18 RX ORDER — OXYTOCIN/0.9 % SODIUM CHLORIDE 30/500 ML
1-24 PLASTIC BAG, INJECTION (ML) INTRAVENOUS CONTINUOUS
Status: DISCONTINUED | OUTPATIENT
Start: 2020-07-18 | End: 2020-07-19

## 2020-07-18 RX ORDER — LIDOCAINE 40 MG/G
CREAM TOPICAL
Status: DISCONTINUED
Start: 2020-07-18 | End: 2020-07-19 | Stop reason: HOSPADM

## 2020-07-18 RX ADMIN — Medication 1 MILLI-UNITS/MIN: at 08:55

## 2020-07-18 RX ADMIN — SODIUM CHLORIDE, POTASSIUM CHLORIDE, SODIUM LACTATE AND CALCIUM CHLORIDE: 600; 310; 30; 20 INJECTION, SOLUTION INTRAVENOUS at 13:07

## 2020-07-18 RX ADMIN — FENTANYL CITRATE 100 MCG: 50 INJECTION, SOLUTION INTRAMUSCULAR; INTRAVENOUS at 17:16

## 2020-07-18 RX ADMIN — FENTANYL CITRATE 100 MCG: 50 INJECTION, SOLUTION INTRAMUSCULAR; INTRAVENOUS at 18:12

## 2020-07-18 RX ADMIN — Medication 1 MILLI-UNITS/MIN: at 13:08

## 2020-07-18 RX ADMIN — FENTANYL CITRATE 100 MCG: 50 INJECTION, SOLUTION INTRAMUSCULAR; INTRAVENOUS at 19:38

## 2020-07-18 RX ADMIN — FENTANYL CITRATE 100 MCG: 50 INJECTION, SOLUTION INTRAMUSCULAR; INTRAVENOUS at 21:01

## 2020-07-18 RX ADMIN — SODIUM CHLORIDE, POTASSIUM CHLORIDE, SODIUM LACTATE AND CALCIUM CHLORIDE: 600; 310; 30; 20 INJECTION, SOLUTION INTRAVENOUS at 21:08

## 2020-07-18 ASSESSMENT — MIFFLIN-ST. JEOR: SCORE: 1623.08

## 2020-07-18 NOTE — PROVIDER NOTIFICATION
07/17/20 3920   Provider Notification   Provider Name/Title Dr. Henning    Method of Notification Phone   Request Evaluate - Remote   Notification Reason Other (Comment)  (Medication requested )     Patient requests medication to help her sleep. Orders received for hydroxyzine 50 mg, PO, once. Telephone orders read back and verified. Will inform pt of plan of care. Francisca Macdonald RN on 7/17/2020 at 11:17 PM

## 2020-07-18 NOTE — PLAN OF CARE
IV leaking into arm and much swelling, patient frustrated and crying does not like IV's anyway and does not want it restarted is frustrated with whole process and either wants to be done or just go home, Dr Uribe given phone update, and gives permission for patient to take a break from induction process, get up to shower, get food from outside, go for a walk and get outside, then in 1-2 hours start pitocin again, plan discussed with patient and her  and they agree with plan, patient off monitor and up and about in room, seems in better spirits

## 2020-07-18 NOTE — PROGRESS NOTES
[facility]  Labor Progress Note    S: Patient resting comfortably, cervadil just removed.     O:  Patient Vitals for the past 24 hrs:   BP Temp Temp src Pulse Resp   20 0210 (!) 143/98 -- -- -- --   20 2005 (!) 143/67 98.3  F (36.8  C) Oral -- 16   20 1600 (!) 143/92 98.6  F (37  C) Oral -- --   20 1340 (!) 140/87 -- -- -- --   20 1130 138/89 98.6  F (37  C) Oral -- --   20 0940 122/83 -- -- -- --   20 0805 117/64 98.1  F (36.7  C) Oral 81 16     SVE: Deferred    FHT: Baseline 135, mod variability, + accelerations, no decelerations      A/P: 29 year old  at 38w2d admitted for IOL for chronic hypertension. Fetus with 2VC    Labor: s/p cytotec x 6, cervadil x 1 (removed this am at 5am). Will trial pitocin. Per patient, cervix still very posterior and difficult exam. Discussed will trial pitocin and if cervix more favorable later, ROM or place cook catheter. Discussed if 24 hours after ROM and pitocin and still no delivery, consider failed IOL. Pt understands and is okay with POC.   FWB: Category 1 FHT  GBS: negative  Rh: negative  HTN: BP rising this am, mild range. No symptoms of preeclampsia. RPT panel this am ordered.   Anti SSA positive- notify peds after delivery,  EKG needed. Prenatal testing reassuring   History of asthma    Pitocin. RPT preeclampsia panel     MD [facility]  2020, 6:36 AM

## 2020-07-18 NOTE — PLAN OF CARE
Patient downey been sound asleep awakened for assessment and cares all explained, patient voices frustration with no progress in cervix and has been here since Thursday ana m okeefe explained and plan to go forward with pitocin which she agrees to and will discuss delivery plans with rounding MD this am

## 2020-07-18 NOTE — PLAN OF CARE
"1300 Patient in better spirits after shower, walk outside and being off monitor , contractions have gotten more regular and intense, IV restarted and pitocin restarted, FHR  Category 1  1345 Web page to update Dr Uribe,, patient up frequently to bathroom to void and frequent BM's, contractions more 'intense\" to patient  1400 Gaby monitor not tracing FHR , intermittent ext toco, , patient does not want external monitors, abdomen re prepped   1435 Gaby functioning again, patient breathing with contractions, frequent repositioning,    "

## 2020-07-19 LAB
BLOOD BANK CMNT PATIENT-IMP: NORMAL
BLOOD BANK CMNT PATIENT-IMP: NORMAL

## 2020-07-19 PROCEDURE — 25000125 ZZHC RX 250: Performed by: OBSTETRICS & GYNECOLOGY

## 2020-07-19 PROCEDURE — 12000000 ZZH R&B MED SURG/OB

## 2020-07-19 PROCEDURE — 0UQGXZZ REPAIR VAGINA, EXTERNAL APPROACH: ICD-10-PCS | Performed by: OBSTETRICS & GYNECOLOGY

## 2020-07-19 PROCEDURE — 59409 OBSTETRICAL CARE: CPT | Performed by: OBSTETRICS & GYNECOLOGY

## 2020-07-19 PROCEDURE — 86900 BLOOD TYPING SEROLOGIC ABO: CPT | Performed by: OBSTETRICS & GYNECOLOGY

## 2020-07-19 PROCEDURE — 25000132 ZZH RX MED GY IP 250 OP 250 PS 637: Performed by: OBSTETRICS & GYNECOLOGY

## 2020-07-19 PROCEDURE — 25000128 H RX IP 250 OP 636: Performed by: ANESTHESIOLOGY

## 2020-07-19 PROCEDURE — 72200001 ZZH LABOR CARE VAGINAL DELIVERY SINGLE

## 2020-07-19 PROCEDURE — 25800030 ZZH RX IP 258 OP 636: Performed by: OBSTETRICS & GYNECOLOGY

## 2020-07-19 RX ORDER — IBUPROFEN 800 MG/1
800 TABLET, FILM COATED ORAL EVERY 6 HOURS PRN
Status: DISCONTINUED | OUTPATIENT
Start: 2020-07-19 | End: 2020-07-21 | Stop reason: HOSPADM

## 2020-07-19 RX ORDER — HYDROCORTISONE 2.5 %
CREAM (GRAM) TOPICAL 3 TIMES DAILY PRN
Status: DISCONTINUED | OUTPATIENT
Start: 2020-07-19 | End: 2020-07-21 | Stop reason: HOSPADM

## 2020-07-19 RX ORDER — LABETALOL 20 MG/4 ML (5 MG/ML) INTRAVENOUS SYRINGE
40 EVERY 10 MIN PRN
Status: DISCONTINUED | OUTPATIENT
Start: 2020-07-19 | End: 2020-07-19

## 2020-07-19 RX ORDER — FLUOXETINE 10 MG/1
10 CAPSULE ORAL DAILY
Status: DISCONTINUED | OUTPATIENT
Start: 2020-07-19 | End: 2020-07-21 | Stop reason: HOSPADM

## 2020-07-19 RX ORDER — BISACODYL 10 MG
10 SUPPOSITORY, RECTAL RECTAL DAILY PRN
Status: DISCONTINUED | OUTPATIENT
Start: 2020-07-21 | End: 2020-07-21 | Stop reason: HOSPADM

## 2020-07-19 RX ORDER — NALOXONE HYDROCHLORIDE 0.4 MG/ML
.1-.4 INJECTION, SOLUTION INTRAMUSCULAR; INTRAVENOUS; SUBCUTANEOUS
Status: DISCONTINUED | OUTPATIENT
Start: 2020-07-19 | End: 2020-07-21 | Stop reason: HOSPADM

## 2020-07-19 RX ORDER — OXYTOCIN/0.9 % SODIUM CHLORIDE 30/500 ML
340 PLASTIC BAG, INJECTION (ML) INTRAVENOUS CONTINUOUS PRN
Status: DISCONTINUED | OUTPATIENT
Start: 2020-07-19 | End: 2020-07-21 | Stop reason: HOSPADM

## 2020-07-19 RX ORDER — AMOXICILLIN 250 MG
2 CAPSULE ORAL 2 TIMES DAILY
Status: DISCONTINUED | OUTPATIENT
Start: 2020-07-19 | End: 2020-07-21 | Stop reason: HOSPADM

## 2020-07-19 RX ORDER — AMOXICILLIN 250 MG
1 CAPSULE ORAL 2 TIMES DAILY
Status: DISCONTINUED | OUTPATIENT
Start: 2020-07-19 | End: 2020-07-21 | Stop reason: HOSPADM

## 2020-07-19 RX ORDER — MODIFIED LANOLIN
OINTMENT (GRAM) TOPICAL
Status: DISCONTINUED | OUTPATIENT
Start: 2020-07-19 | End: 2020-07-21 | Stop reason: HOSPADM

## 2020-07-19 RX ORDER — ACETAMINOPHEN 325 MG/1
650 TABLET ORAL EVERY 4 HOURS PRN
Status: DISCONTINUED | OUTPATIENT
Start: 2020-07-19 | End: 2020-07-21 | Stop reason: HOSPADM

## 2020-07-19 RX ORDER — LABETALOL 20 MG/4 ML (5 MG/ML) INTRAVENOUS SYRINGE
80 EVERY 10 MIN PRN
Status: DISCONTINUED | OUTPATIENT
Start: 2020-07-19 | End: 2020-07-19

## 2020-07-19 RX ORDER — OXYTOCIN 10 [USP'U]/ML
10 INJECTION, SOLUTION INTRAMUSCULAR; INTRAVENOUS
Status: DISCONTINUED | OUTPATIENT
Start: 2020-07-19 | End: 2020-07-21 | Stop reason: HOSPADM

## 2020-07-19 RX ORDER — OXYTOCIN/0.9 % SODIUM CHLORIDE 30/500 ML
100 PLASTIC BAG, INJECTION (ML) INTRAVENOUS CONTINUOUS
Status: DISCONTINUED | OUTPATIENT
Start: 2020-07-19 | End: 2020-07-21 | Stop reason: HOSPADM

## 2020-07-19 RX ORDER — TRANEXAMIC ACID 10 MG/ML
1 INJECTION, SOLUTION INTRAVENOUS EVERY 30 MIN PRN
Status: DISCONTINUED | OUTPATIENT
Start: 2020-07-19 | End: 2020-07-21 | Stop reason: HOSPADM

## 2020-07-19 RX ORDER — LABETALOL 20 MG/4 ML (5 MG/ML) INTRAVENOUS SYRINGE
20 EVERY 10 MIN PRN
Status: DISCONTINUED | OUTPATIENT
Start: 2020-07-19 | End: 2020-07-19

## 2020-07-19 RX ADMIN — DOCUSATE SODIUM AND SENNOSIDES 1 TABLET: 8.6; 5 TABLET, FILM COATED ORAL at 10:27

## 2020-07-19 RX ADMIN — IBUPROFEN 800 MG: 800 TABLET ORAL at 16:39

## 2020-07-19 RX ADMIN — ACETAMINOPHEN 650 MG: 325 TABLET, FILM COATED ORAL at 04:13

## 2020-07-19 RX ADMIN — DOCUSATE SODIUM AND SENNOSIDES 1 TABLET: 8.6; 5 TABLET, FILM COATED ORAL at 21:13

## 2020-07-19 RX ADMIN — Medication: at 01:33

## 2020-07-19 RX ADMIN — FLUOXETINE 10 MG: 10 CAPSULE ORAL at 10:27

## 2020-07-19 RX ADMIN — SODIUM CHLORIDE, POTASSIUM CHLORIDE, SODIUM LACTATE AND CALCIUM CHLORIDE: 600; 310; 30; 20 INJECTION, SOLUTION INTRAVENOUS at 01:45

## 2020-07-19 RX ADMIN — IBUPROFEN 800 MG: 800 TABLET ORAL at 10:27

## 2020-07-19 RX ADMIN — Medication 340 ML/HR: at 02:32

## 2020-07-19 NOTE — PLAN OF CARE
Patient has been stable today. She had one severe range blood pressure, but her second check after 15 minutes was WNL. Denies any other signs or symptoms. Pain well managed with ibuprofen so far today. She has attempted to breast feed independently a couple of times today, but declined any assistance from writer. Discussed hand expression and pumping, supply and demand, and breast feeding best practice.  present and supportive.

## 2020-07-19 NOTE — ANESTHESIA PROCEDURE NOTES
Procedure note : epidural catheter  Staff -   Anesthesiologist:  Desmond De La Paz DO      Performed By: anesthesiologist    Referred By: Corazon Uribe MD    Pre-Procedure  Performed by Desmond De La Paz DO  Referred by Corazon Uribe MD  Location: OB      Pre-Anesthestic Checklist: patient identified, IV checked, risks and benefits discussed, informed consent, monitors and equipment checked, pre-op evaluation and at physician/surgeon's request    Timeout  Correct Patient: Yes   Correct Procedure: Yes   Correct Site: Yes   Correct Laterality: N/A   Correct Position: Yes   Site Marked: N/A   .   Procedure Documentation    .    Procedure: epidural catheter, .       .  .        Assessment/Narrative  .  .  . Comments:  .Diagnosis- Anticipated vaginal delivery    Continuous Labor Epidural, indication is for labor pain. Following an discussion of the expectations, benefits and risk (including but not limited to nerve damage, infection, bleeding, spinal headache, partial or failed block)--questions were encouraged and answered. The patient appears to understand, and consents to proceed.     The patient received a fluid bolus per orders    Time-out performed.     The patient was in the sitting position, a PVP prep times three was done in the lumbar area followed by placement of a sterile drape. The skin was then infiltrated with lidocaine. A 17ga Touhy needle was then advanced under a loss of resistance technique until the epidural space was identified. The epidural catheter was then advanced and secured. The catheter was then bolused in divided doses with Bupivicaine 0.25% 12 cc, while the patient/fetus was monitored. Infusion orders written and infusion of bupivicaine 0.125% + fentanyl 2mcg/cc 10cc per hour started. PCEA 5cc bolus ever 15 minutes, with a maximum of 20cc per hour.    I or my partner, was immediately available and frequently monitored at necessary intervals.      SANDRA De La Paz

## 2020-07-19 NOTE — PROVIDER NOTIFICATION
20 0224   Provider Notification   Provider Name/Title Dr. Hunter   Method of Notification At Bedside   Request Attend Delivery     In house OB called to bedside, pt pushed baby to , FHT baseline previously 120 now 90s for several minutes, MD delivered the baby.

## 2020-07-19 NOTE — PROGRESS NOTES
I was called for imminent delivery.    29 year old  at 38w3d with FHT in 90s for several minutes and head on the perineum.  Epidural for pain control.  With one strong push, she delivered a vigorous female infant in HERMINIO, immediately to patient's abdomen where she was stimulated and dried.  Delayed cord clamping.    Placenta spontaneous, intact with 3VC.  At this time the primary MD arrived to complete the vaginal laceration repair.     Katelin Khan MD, MPH  [facility]

## 2020-07-19 NOTE — PLAN OF CARE
Data: Yamilet Vazquez transferred to 0442 via wheelchair at 0530. Baby transferred via parent's arms.  Action: Receiving unit notified of transfer: Yes. Patient and family notified of room change. Report given to CONCHIS Winkler at 0530. Belongings sent to receiving unit. Accompanied by Registered Nurse. Oriented patient to surroundings. Call light within reach. ID bands double-checked with receiving RN.  Response: Patient tolerated transfer and is stable.      Patients mobililty level scored using the bedside mobility assistance tool (BMAT). Patient is at a mobility level test number: 4. Mobility equipment used: none required. Required assist of 0 staff members. Further use of BMAT scoring not required.

## 2020-07-19 NOTE — L&D DELIVERY NOTE
Delivery Date:  07/19/2020      HISTORY:  This patient is a 29-year-old G3, P0-0-2-0 at 38+3 weeks' gestation who presented to Labor and Delivery on the evening of 07/16/2020 for cervical ripening for induction of labor due to chronic hypertension.      This patient's pregnancy has been complicated by:   1.  Obesity.   2.  Chronic hypertension, for which the patient was on metoprolol prior to pregnancy but did stop this with pregnancy.  She did not need antihypertensive medications during pregnancy.   3.  Sjogren's.  This pregnancy, the patient was found to have anti-SSA antibodies.  She did have normal cardiac and heart rate monitoring for baby.   4.  Two-vessel cord.   5.  Abnormal Pap smear with colposcopy during this pregnancy with an impression of VIJAY 2, plan colposcopy postpartum.   6.  Asthma, which is mild.   7.  Elevated early 1-hour GCT with a normal early 3-hour GTT.  She also had normal testing at 28 weeks.   8.  History of depression and schizoaffective disorder.   9.  Rh negative.   10.  Did receive RhoGAM.      LABOR COURSE FIRST STAGE:  The patient was admitted to Labor and Delivery on the evening of 07/16/2020 for cervical ripening.  She received oral Cytotec overnight and into the morning of 07/17.  Cervix remained unfavorable and on the evening of 07/17, a Cervidil was placed.  This was removed at 5:00 a.m. on 07/18/2020.  At that time, the patient's cervix was still unfavorable but plan was made to do a trial of Pitocin.  The patient was given a short break on the morning of 07/18/2020 as she was tired and feeling frustrated.  Pitocin was then started and was at a maximum rate of 5 milliunits a minute.  The contractions increased in intensity throughout the day.  She was 2 cm/-2 station at approximately 7:30 p.m.  Fetal heart tones were reassuring.  The patient was rechecked at approximately 11:00 p.m. and remained 2 cm dilated.  She received an epidural for pain control.  Her pain suddenly  became significantly worse, and she was found to be completely dilated at 12:34 a.m. on .  Of note, the patient's blood pressures ranged from normal range to mild with occasional 140/80  blood pressures.  Labs were done, which were normal.  A PC ratio was 0.3.  During the hour from around the time that the patient received her epidural and went from 2 cm to completely dilated, she had a run of significantly elevated blood pressures.  These did decrease, and no treatment was implemented given the severe pain that the patient was in and the epidural being placed at that time.  Blood pressure will be continued to be closely monitored.      SECOND STAGE:  Again, the patient was found to be completely dilated at 12:34 a.m.  Fetal heart tones were reassuring.  The patient had received her epidural for pain control and did not start pushing until 1:46 a.m.  At 1:46, the patient did begin pushing.  She initially made slow progress.  I was present at [facility], caring for another patient, and did contact Labor and Delivery at 12:20 a.m., stating I was on my way to the hospital.  On my arrival to Labor and Delivery, the patient had recently delivered with the in-house physician at 2:28 a.m. as she rapidly went to  and delivering.  There were no complications during the delivery.  Again, time of delivery was 2:28 when a female was delivered from the vertex presentation over an intact perineum.  Apgars are pending at the time of this dictation, but baby was vigorous immediately following delivery and able to stay in the room with mother.  Weight is also pending at the time of this dictation.      THIRD STAGE:  Placenta then delivered intact with a 3-vessel cord shortly after delivery.  I arrived in the room at this time and took over care from Dr. Khan, who is in-house physician that tended to delivery.  On my arrival, I examined the perineum, and a small right vaginal sulcus tear was present.  This was  repaired in a running fashion with 3-0 Vicryl.  There were no other lacerations present.  Quantitative blood loss for delivery was 496 mL.  There were no complications during either the labor or delivery, and mom and baby are stable in the labor suite at this time following delivery.  The patient's blood pressure will be monitored closely in the postpartum period.         FAHAD BARNES MD             D: 2020   T: 2020   MT:       Name:     DUTCH KAPLAN   MRN:      [id number]        Account:        [id number]   :      1990        Delivery Date:  2020               Document: [id number]

## 2020-07-19 NOTE — PROVIDER NOTIFICATION
07/19/20 0048   Provider Notification   Provider Name/Title Dr. Uribe   Method of Notification Phone   Request Evaluate - Remote   Notification Reason SVE;Status Update;Maternal Vital Sign Change;Labor Status;Membrane Status;Pain     Notified MD HI for clear fluid followed by 10/10 pain, pt now more comfortable with epidural but feeling intense rectal pressure, SVE 10/100/0, also has had several sever range BPs since epidural placement, appears to be pain related, currently normal range but RN requested hypertension order set which MD TORB for, per MD start with labetalol. MD currently headed to [place] for delivery but OK to start pushing if intense urge persists, may labor down for 30 min if urge subsides, Dr. Henning is back up if needed.

## 2020-07-19 NOTE — ANESTHESIA POSTPROCEDURE EVALUATION
Patient: Yamilet Vazquez    * No procedures listed *    Diagnosis:* No pre-op diagnosis entered *  Diagnosis Additional Information: No value filed.    Anesthesia Type:  Epidural    Note:  Anesthesia Post Evaluation    Patient location during evaluation: PACU  Patient participation: Able to fully participate in evaluation  Level of consciousness: awake  Pain management: adequate  Airway patency: patent  Cardiovascular status: acceptable  Respiratory status: acceptable  Hydration status: euvolemic  PONV: controlled     Anesthetic complications: None    Comments: S/P epidural for labor. I or my partner remained immediately available, monitored the patient, and supervised nursing staff at key events and necessary intervals.    The patient is doing well. VSS Temp normal. Satisfactory cardiovascular and repiratory function. Neuro at baseline. Denies positional headache. Minimal side effects easily managed w/ PRN meds. No apparent anesthetic complications. No follow-up required.    [email or web address]        Last vitals:  Vitals:    07/19/20 0423 07/19/20 0438 07/19/20 0453   BP: 134/80 138/82 139/84   Pulse:      Resp:      Temp:      SpO2:            Electronically Signed By: Nakul Mraion MD  July 19, 2020  9:21 AM

## 2020-07-19 NOTE — ANESTHESIA PREPROCEDURE EVALUATION
Anesthesia Pre-Procedure Evaluation    Patient: Yamilet Vazquez   MRN: [id number] : 1990          Preoperative Diagnosis: * No pre-op diagnosis entered *    * No procedures listed *    Past Medical History:   Diagnosis Date     Acute otitis media 2013     ADHD      Anxiety      Arthritis     Current pregnancy     Asthma      Chronic hypertension      Depressive disorder      History of seizures as a child      Obesity      Psoriasis      Sjogren's syndrome (H)      SS-A antibody positive      Past Surgical History:   Procedure Laterality Date     AS INDUCED ABORTN BY DIL/EVAC       TONSILLECTOMY, ADENOIDECTOMY, MYRINGOTOMY, INSERT TUBE BILATERAL, COMBINED       Anesthesia Evaluation       history and physical reviewed .             ROS/MED HX    ENT/Pulmonary:     (+)asthma , . .    Neurologic:  - neg neurologic ROS     Cardiovascular:     (+) hypertension----. : . . . :. .       METS/Exercise Tolerance:     Hematologic:         Musculoskeletal:         GI/Hepatic:  - neg GI/hepatic ROS       Renal/Genitourinary:         Endo: Comment: .Body mass index is 40.43 kg/m .      (+) Obesity, .      Psychiatric:     (+) psychiatric history anxiety and depression      Infectious Disease:         Malignancy:         Other:                     neg OB ROS            Physical Exam  Normal systems: cardiovascular, pulmonary and dental    Airway   Mallampati: III    Dental     Cardiovascular       Pulmonary             Lab Results   Component Value Date    WBC 7.9 2020    HGB 12.2 2020    HCT 36.0 2020     2020     2020    POTASSIUM 3.1 (L) 2020    CHLORIDE 105 2020    CO2 22 2020    BUN 6 (L) 2020    CR 0.52 2020    GLC 69 (L) 2020    GAVIN 8.3 (L) 2020    ALBUMIN 4.1 2014    PROTTOTAL 7.4 2014    ALT 16 2020    AST 21 2020    ALKPHOS 41 2014    BILITOTAL 0.6 2014    TSH 1.37 10/05/2005  "   HCG Negative 07/11/2014       Preop Vitals  BP Readings from Last 3 Encounters:   07/18/20 (!) 144/92   06/01/20 108/73   01/16/20 139/87    Pulse Readings from Last 3 Encounters:   07/17/20 81   01/16/20 92   04/19/13 95      Resp Readings from Last 3 Encounters:   07/18/20 16   06/01/20 18   01/16/20 20    SpO2 Readings from Last 3 Encounters:   01/16/20 98%   07/11/14 99%   02/13/13 94%      Temp Readings from Last 1 Encounters:   07/18/20 97.7  F (36.5  C) (Oral)    Ht Readings from Last 1 Encounters:   07/16/20 1.549 m (5' 1\")      Wt Readings from Last 1 Encounters:   07/18/20 97.1 kg (214 lb)    Estimated body mass index is 40.43 kg/m  as calculated from the following:    Height as of this encounter: 1.549 m (5' 1\").    Weight as of this encounter: 97.1 kg (214 lb).       Anesthesia Plan  Procedure only, no anesthetic delivered    History & Physical Review      ASA Status:  3 .  OB Epidural Asa: 2       Plan for Epidural            Postoperative Care      Consents  Anesthetic plan, risks, benefits and alternatives discussed with:  Patient..                 Desmond De La Paz DO                    .  "

## 2020-07-19 NOTE — PLAN OF CARE
Discussed plan per MD with pt, she is relaxed now and feeling no urge to push so would like to labor down for 30 min.

## 2020-07-19 NOTE — PLAN OF CARE
This writer assumed patient cares at 0530 from CONCHIS Walsh. Patient requested formula to feed baby now. Patient wishes to both breastfeed, which she did in L&D prior to transfer to , and bottle feed formula per her preference. Safe sleep education for  taught to patient and SO. Instructed patient to call for help to bathroom. Patient requesting to rest now. Call light within reach.

## 2020-07-20 LAB — HGB BLD-MCNC: 9.4 G/DL (ref 11.7–15.7)

## 2020-07-20 PROCEDURE — 12000000 ZZH R&B MED SURG/OB

## 2020-07-20 PROCEDURE — 25000132 ZZH RX MED GY IP 250 OP 250 PS 637: Performed by: OBSTETRICS & GYNECOLOGY

## 2020-07-20 PROCEDURE — 85018 HEMOGLOBIN: CPT | Performed by: OBSTETRICS & GYNECOLOGY

## 2020-07-20 PROCEDURE — 36415 COLL VENOUS BLD VENIPUNCTURE: CPT | Performed by: OBSTETRICS & GYNECOLOGY

## 2020-07-20 RX ADMIN — FLUOXETINE 10 MG: 10 CAPSULE ORAL at 08:47

## 2020-07-20 RX ADMIN — DOCUSATE SODIUM AND SENNOSIDES 2 TABLET: 8.6; 5 TABLET, FILM COATED ORAL at 08:48

## 2020-07-20 RX ADMIN — IBUPROFEN 800 MG: 800 TABLET ORAL at 22:00

## 2020-07-20 RX ADMIN — IBUPROFEN 800 MG: 800 TABLET ORAL at 08:47

## 2020-07-20 NOTE — PLAN OF CARE
VSS. Up ad christen. Voiding without difficulty. Denies pain this shift, declines pain meds. Bottle feeding infant with formula. Bonding appropriately with infant and attentive to cares.  supportive at bedside.

## 2020-07-20 NOTE — PLAN OF CARE
Vital signs stable. Pain managed with Tylenol and Ibuprofen when needed. Pt up ambulating on her own and voiding without difficulty.  Breastfeeding up until this afternoon, now pumping due to sore nipples.  No signs/symptoms of infection noted.   at bedside and supportive.  Bonding well with baby.  Plan is for discharge tomorrow.

## 2020-07-20 NOTE — PLAN OF CARE
BP at 1900 130/80s. Denies any pain at this time . Instructed to fill out birth certificate and depression scale  and watch video. FOB here, supportive.

## 2020-07-20 NOTE — PLAN OF CARE
Vital signs stable on room air. Bonding well with infant.  at bedside and supportive. Independent with cares for self and infant. Initiated breastfeeding again with the LC and using a nipple shield. Pt had a successful feeding session on her own with the nipple shield and initiated pumping. With her first pump she had 1.4 ml of colostrum output. Pt plans to continue breastfeeding and pumping. Pain adequately controled with ibuprofen.

## 2020-07-20 NOTE — PROGRESS NOTES
[facility] Obstetrics Post-Partum Progress Note        Interval History:   Doing well.  Pain is adequately controlled.  Vaginal bleeding is normal postpartum flow.  Breastfeeding well. Baby under lights for jaundice, okay for discharge per peds.           Significant Problems:      Patient Active Problem List   Diagnosis     Acute otitis media     CARDIOVASCULAR SCREENING; LDL GOAL LESS THAN 160     Single umbilical artery affecting management of mother in turner pregnancy, antepartum     Encounter for triage in pregnant patient     Encounter for induction of labor             Review of Systems:    The patient denies any chest pain, shortness of breath, excessive pain, fever, chills, nausea or vomiting.          Medications:   All medications related to the patient's surgery have been reviewed          Physical Exam:       Patient Vitals for the past 24 hrs:   BP Temp Temp src Pulse Resp   07/20/20 0836 123/79 97.8  F (36.6  C) Oral 90 16   07/19/20 2319 124/73 98  F (36.7  C) Axillary 94 16   07/19/20 1959 138/80 -- -- 96 --   07/19/20 1655 136/80 -- -- 74 --   07/19/20 1639 (!) 151/108 97.9  F (36.6  C) Axillary 75 16   07/19/20 1020 (!) 137/93 98.1  F (36.7  C) Oral 81 16     All vitals stable   Uterine fundus is firm, non-tender and at the level of the umbilicus  Episiotomy sutures intact and wound healing well  Lower extremities without edema or tenderness          Data:     Lab Results   Component Value Date    HGB 9.4 (L) 07/20/2020    HGB 12.2 07/18/2020    HGB 11.9 07/16/2020            Assessment and Plan:    Assessment:    Post-partum day #1  Normal spontaneous vaginal delivery  S/p  Induction for CHTN BP improved  SSA  2-vessel cord  Doing well.      Plan:   F/u in 1 weeks for BP check  Discharge later today      Marlen Nia Salazar MD  July 20, 2020  9:34 AM

## 2020-07-20 NOTE — LACTATION NOTE
This note was copied from a baby's chart.  LC visit.  Parents had decided they may want to pump and bottle and use formula.  Yamilet stated that breastfeeding has been difficult and she might just pump at home.   offered support with breastfeeding prior to discharge to allow her to keep her options open.  Good latch obtained with minimal effort once a nipple shield was applied.  Her nipples are everted but invert with stimulation.  HE produced small beads of milk but she will continue to need to stimulate her production by nursing often and pumping post feeds.  Plan made to nurse with the nipple shield at least some of the time followed by pumping and supplementing infant with formula 15-30 mL until the follow up Pediatrician appointment. Infant has been under phototherapy treatment. MARIAM recommends that she wean off formula as her milk volumes increase.  She is aware she may call prn.  MARIAM also discussed the importance of emptying her breasts often to avoid engorgement and mastitis as her milk volumes increase.

## 2020-07-21 VITALS
SYSTOLIC BLOOD PRESSURE: 143 MMHG | TEMPERATURE: 97.6 F | HEIGHT: 61 IN | WEIGHT: 214 LBS | RESPIRATION RATE: 14 BRPM | OXYGEN SATURATION: 91 % | DIASTOLIC BLOOD PRESSURE: 86 MMHG | BODY MASS INDEX: 40.4 KG/M2 | HEART RATE: 104 BPM

## 2020-07-21 PROCEDURE — 25000132 ZZH RX MED GY IP 250 OP 250 PS 637: Performed by: OBSTETRICS & GYNECOLOGY

## 2020-07-21 RX ORDER — IBUPROFEN 800 MG/1
800 TABLET, FILM COATED ORAL EVERY 6 HOURS PRN
Qty: 40 TABLET | Refills: 0 | Status: ON HOLD | OUTPATIENT
Start: 2020-07-21 | End: 2021-06-22

## 2020-07-21 RX ORDER — ACETAMINOPHEN 325 MG/1
975 TABLET ORAL EVERY 6 HOURS PRN
Qty: 60 TABLET | Refills: 1 | Status: ON HOLD | OUTPATIENT
Start: 2020-07-21 | End: 2021-07-08

## 2020-07-21 RX ORDER — AMOXICILLIN 250 MG
1 CAPSULE ORAL 2 TIMES DAILY PRN
Qty: 30 TABLET | Refills: 0 | Status: ON HOLD | OUTPATIENT
Start: 2020-07-21 | End: 2023-07-08

## 2020-07-21 RX ADMIN — ACETAMINOPHEN 650 MG: 325 TABLET, FILM COATED ORAL at 08:36

## 2020-07-21 RX ADMIN — DOCUSATE SODIUM AND SENNOSIDES 1 TABLET: 8.6; 5 TABLET, FILM COATED ORAL at 08:29

## 2020-07-21 RX ADMIN — FLUOXETINE 10 MG: 10 CAPSULE ORAL at 08:29

## 2020-07-21 RX ADMIN — IBUPROFEN 800 MG: 800 TABLET ORAL at 08:29

## 2020-07-21 NOTE — DISCHARGE INSTRUCTIONS
Postpartum Vaginal Delivery Instructions    Lactation: [phone]    Activity       Ask family and friends for help when you need it.    Do not place anything in your vagina for 6 weeks.    You are not restricted on other activities, but take it easy for a few weeks to allow your body to recover from delivery.  You are able to do any activities you feel up to that point.    No driving until you have stopped taking your pain medications (usually two weeks after delivery).     Call your health care provider if you have any of these symptoms:       Increased pain, swelling, redness, or fluid around your stiches from an episiotomy or perineal tear.    A fever above 100.4 F (38 C) with or without chills when placing a thermometer under your tongue.    You soak a sanitary pad with blood within 1 hour, or you see blood clots larger than a golf ball.    Bleeding that lasts more than 6 weeks.    Vaginal discharge that smells bad.    Severe pain, cramping or tenderness in your lower belly area.    A need to urinate more frequently (use the toilet more often), more urgently (use the toilet very quickly), or it burns when you urinate.    Nausea and vomiting.    Redness, swelling or pain around a vein in your leg.    Problems breastfeeding or a red or painful area on your breast.    Chest pain and cough or are gasping for air.    Problems coping with sadness, anxiety, or depression.  If you have any concerns about hurting yourself or the baby, call your provider immediately.     You have questions or concerns after you return home.     Keep your hands clean:  Always wash your hands before touching your perineal area and stitches.  This helps reduce your risk of infection.  If your hands aren't dirty, you may use an alcohol hand-rub to clean your hands. Keep your nails clean and short.

## 2020-07-21 NOTE — LACTATION NOTE
This note was copied from a baby's chart.  LC visit. Infant has been nursing occasionally with use of the shield, as well as taking EBM and formula.  LC discussed Yamilet's feeding plan for home.  She wanted to pump during the day and sleep over the night. LC discussed the risks of developing clogged ducts that may lead to mastitis if she does not empty her breasts consistently with symptoms of fullness. She and her  understood the risks and decided they may want to breastfeed at night since infant has been nursing well now.  LC offered phone support prn.  They have been preparing for discharge to home.

## 2020-07-21 NOTE — PLAN OF CARE
VS stable. Ibuprofen and Tylenol adequately controlling pain. FOB here and supportive. Independent with care for self and infant. Bonding well with baby.

## 2020-07-21 NOTE — PLAN OF CARE
Data: Vital signs within normal limits. Postpartum checks within normal limits - see flow record. Patient eating and drinking normally. Patient able to empty bladder independently and is up ambulating. No apparent signs of infection.  Laceration  healing well. Patient performing self cares and is able to care for infant.  Action: Patient medicated during the shift for pain. See MAR. Patient reassessed within 1 hour after each medication and pain was improved - patient stated she was comfortable. Patient education done about discharge and infant cares. See flow record.  Response: Positive attachment behaviors observed with infant. Support persons are present.     Discharge instructions discussed and all questions and concerns addressed. Pt already has a breast pump at home. Pt discharged with infant and  to home in private transportation at 1100.

## 2020-07-21 NOTE — PROGRESS NOTES
"OB Post-partum Note  PPD# 2    S:  Patient doing well.  Pain controlled.  Voiding.  Bleeding is normal.  Breast feeding.    O:  /79   Pulse 104   Temp 98.4  F (36.9  C) (Oral)   Resp 16   Ht 1.549 m (5' 1\")   Wt 97.1 kg (214 lb)   LMP 10/21/2019   SpO2 91%   Breastfeeding yes  BMI 40.43 kg/m    Gen- A&O, NAD  Abd- Non-tender, fundus non tender and firm   Ext- non-tender, no calf pain    Hemoglobin   Date Value Ref Range Status   2020 9.4 (L) 11.7 - 15.7 g/dL Final     Oneg - baby Rh neg  Rubella Immune    A/P: 29 year old  PPD# 2 s/p     1.  Routine post-partum cares  2.  Analgesia tylenol and ibuprofen - requests Rx for these.  3.  Discharge today.  4.  The plan of care was discussed with the patient.  She expressed understanding and agreement  5.  RTC in 1 week for BP and mood check and  6 weeks for postpartum check.  6.  Indications to call or return were discussed. Post partum instructions were given  7. Depression on prozac.  Plan visit in 1 week to check mood.  Has prozac at home - does not need Rx.  8. Acute blood loss anemia - recommend Fe supplement.  Rx given.  Also discussed stool softner and Rx given.      Corazon Uribe MD  2020  7:35 AM  "

## 2020-12-16 LAB
ABO + RH BLD: NORMAL
ABO + RH BLD: NORMAL
BLD GP AB SCN SERPL QL: NEGATIVE
HBV SURFACE AG SERPL QL IA: NEGATIVE
HIV 1+2 AB+HIV1 P24 AG SERPL QL IA: NEGATIVE
RUBELLA ABY IGG: NORMAL
RUBELLA ANTIBODY IGG QUANTITATIVE: NORMAL IU/ML
TREPONEMA ANTIBODIES: NONREACTIVE

## 2020-12-21 ENCOUNTER — MEDICAL CORRESPONDENCE (OUTPATIENT)
Dept: HEALTH INFORMATION MANAGEMENT | Facility: CLINIC | Age: 30
End: 2020-12-21

## 2020-12-21 ENCOUNTER — TRANSFERRED RECORDS (OUTPATIENT)
Dept: HEALTH INFORMATION MANAGEMENT | Facility: CLINIC | Age: 30
End: 2020-12-21

## 2020-12-21 ENCOUNTER — TRANSCRIBE ORDERS (OUTPATIENT)
Dept: MATERNAL FETAL MEDICINE | Facility: CLINIC | Age: 30
End: 2020-12-21

## 2020-12-21 DIAGNOSIS — O26.90 PREGNANCY RELATED CONDITION, ANTEPARTUM: Primary | ICD-10-CM

## 2020-12-23 DIAGNOSIS — R76.8 SS-A ANTIBODY POSITIVE: Primary | ICD-10-CM

## 2020-12-24 DIAGNOSIS — R76.8 SS-A ANTIBODY POSITIVE: Primary | ICD-10-CM

## 2020-12-24 DIAGNOSIS — O10.919 CHRONIC HYPERTENSION AFFECTING PREGNANCY: ICD-10-CM

## 2021-02-01 ENCOUNTER — PRE VISIT (OUTPATIENT)
Dept: MATERNAL FETAL MEDICINE | Facility: CLINIC | Age: 31
End: 2021-02-01

## 2021-02-03 ENCOUNTER — HOSPITAL ENCOUNTER (OUTPATIENT)
Dept: CARDIOLOGY | Facility: CLINIC | Age: 31
Discharge: HOME | End: 2021-02-03
Attending: OBSTETRICS & GYNECOLOGY | Admitting: OBSTETRICS & GYNECOLOGY
Payer: COMMERCIAL

## 2021-02-03 ENCOUNTER — OFFICE VISIT (OUTPATIENT)
Dept: CARDIOLOGY | Facility: CLINIC | Age: 31
End: 2021-02-03
Payer: COMMERCIAL

## 2021-02-03 DIAGNOSIS — O35.BXX0 ANOMALY OF HEART OF FETUS AFFECTING PREGNANCY, ANTEPARTUM, SINGLE OR UNSPECIFIED FETUS: Primary | ICD-10-CM

## 2021-02-03 DIAGNOSIS — R76.8 SS-A ANTIBODY POSITIVE: ICD-10-CM

## 2021-02-03 PROCEDURE — 93325 DOPPLER ECHO COLOR FLOW MAPG: CPT | Mod: 26 | Performed by: PEDIATRICS

## 2021-02-03 PROCEDURE — 76825 ECHO EXAM OF FETAL HEART: CPT | Mod: 26 | Performed by: PEDIATRICS

## 2021-02-03 PROCEDURE — 99202 OFFICE O/P NEW SF 15 MIN: CPT | Mod: 25 | Performed by: PEDIATRICS

## 2021-02-03 PROCEDURE — 93325 DOPPLER ECHO COLOR FLOW MAPG: CPT

## 2021-02-03 PROCEDURE — 76827 ECHO EXAM OF FETAL HEART: CPT | Mod: 26 | Performed by: PEDIATRICS

## 2021-02-03 NOTE — PROGRESS NOTES
Fetal Cardiology Consult    Date of Visit:   02/03/2021  Gestational Age: 16 weeks  Due Date:  07/21/2021  Maternal Sjogren's syndrome    Dear Dr. Guerin    I had the opportunity to meet with Yamilet and her partner today for a Fetal Cardiology Consult and Fetal Echocardiography.    Fetal Echo demonstrated Likely normal fetal echocardiogram. Fetal heart rate 151 bpm, with 1:1 atrioventricular conduction.The MN interval is 90- 100 ms. No MAGNO.    I have reviewed the Fetal Echo findings.     The parents had appropriate questions. I did my best to answer their questions.    Plan:    Thank you for allowing me to participate in Yamilet's care.  Feel free to contact me with questions.    I spend 10 minutes counseling the patient about her fetal echocardiogram findings. All of this time was face to face.    Dr Corrine Fox  Pediatric Cardiologist  [facility]  Phone [phone]

## 2021-02-18 ENCOUNTER — DOCUMENTATION ONLY (OUTPATIENT)
Dept: MATERNAL FETAL MEDICINE | Facility: CLINIC | Age: 31
End: 2021-02-18

## 2021-02-18 NOTE — PROGRESS NOTES
Carlton called stating that she had a COVID test come back positive yesterday (2/17/21).  She started having symptoms of a sore throat and nasal congestion on Wednesday, February 10th.  She is still symptomatic currently.  After discussing with , [facility] will reschedule her appointment from today until next week.  Patient is aware to call [facility] with update if she is still symptomatic and we will reeval timing of ultrasound appointment.

## 2021-02-19 ENCOUNTER — APPOINTMENT (OUTPATIENT)
Dept: CT IMAGING | Facility: CLINIC | Age: 31
End: 2021-02-19
Attending: EMERGENCY MEDICINE
Payer: COMMERCIAL

## 2021-02-19 ENCOUNTER — HOSPITAL ENCOUNTER (EMERGENCY)
Facility: CLINIC | Age: 31
Discharge: HOME | End: 2021-02-19
Attending: EMERGENCY MEDICINE | Admitting: EMERGENCY MEDICINE
Payer: COMMERCIAL

## 2021-02-19 VITALS
SYSTOLIC BLOOD PRESSURE: 135 MMHG | DIASTOLIC BLOOD PRESSURE: 84 MMHG | TEMPERATURE: 97.9 F | OXYGEN SATURATION: 97 % | WEIGHT: 213.9 LBS | RESPIRATION RATE: 20 BRPM | HEART RATE: 98 BPM | BODY MASS INDEX: 40.42 KG/M2

## 2021-02-19 DIAGNOSIS — R07.9 CHEST PAIN, UNSPECIFIED TYPE: ICD-10-CM

## 2021-02-19 DIAGNOSIS — R04.2 HEMOPTYSIS: ICD-10-CM

## 2021-02-19 DIAGNOSIS — Z20.822 SUSPECTED COVID-19 VIRUS INFECTION: ICD-10-CM

## 2021-02-19 DIAGNOSIS — U07.1 2019 NOVEL CORONAVIRUS DISEASE (COVID-19): Primary | ICD-10-CM

## 2021-02-19 LAB
ANION GAP SERPL CALCULATED.3IONS-SCNC: 5 MMOL/L (ref 3–14)
BASOPHILS # BLD AUTO: 0 10E9/L (ref 0–0.2)
BASOPHILS NFR BLD AUTO: 0.1 %
BUN SERPL-MCNC: 6 MG/DL (ref 7–30)
CALCIUM SERPL-MCNC: 8.9 MG/DL (ref 8.5–10.1)
CHLORIDE SERPL-SCNC: 108 MMOL/L (ref 94–109)
CO2 SERPL-SCNC: 26 MMOL/L (ref 20–32)
CREAT SERPL-MCNC: 0.57 MG/DL (ref 0.52–1.04)
DIFFERENTIAL METHOD BLD: NORMAL
EOSINOPHIL # BLD AUTO: 0 10E9/L (ref 0–0.7)
EOSINOPHIL NFR BLD AUTO: 0.1 %
ERYTHROCYTE [DISTWIDTH] IN BLOOD BY AUTOMATED COUNT: 12.6 % (ref 10–15)
GFR SERPL CREATININE-BSD FRML MDRD: >90 ML/MIN/{1.73_M2}
GLUCOSE SERPL-MCNC: 90 MG/DL (ref 70–99)
HCT VFR BLD AUTO: 36.6 % (ref 35–47)
HETEROPH AB SER QL: NEGATIVE
HGB BLD-MCNC: 13 G/DL (ref 11.7–15.7)
IMM GRANULOCYTES # BLD: 0 10E9/L (ref 0–0.4)
IMM GRANULOCYTES NFR BLD: 0.4 %
INTERPRETATION ECG - MUSE: NORMAL
LYMPHOCYTES # BLD AUTO: 1.3 10E9/L (ref 0.8–5.3)
LYMPHOCYTES NFR BLD AUTO: 18 %
MCH RBC QN AUTO: 31.4 PG (ref 26.5–33)
MCHC RBC AUTO-ENTMCNC: 35.5 G/DL (ref 31.5–36.5)
MCV RBC AUTO: 88 FL (ref 78–100)
MONOCYTES # BLD AUTO: 0.7 10E9/L (ref 0–1.3)
MONOCYTES NFR BLD AUTO: 8.9 %
NEUTROPHILS # BLD AUTO: 5.3 10E9/L (ref 1.6–8.3)
NEUTROPHILS NFR BLD AUTO: 72.5 %
NRBC # BLD AUTO: 0 10*3/UL
NRBC BLD AUTO-RTO: 0 /100
NT-PROBNP SERPL-MCNC: 52 PG/ML (ref 0–450)
PLATELET # BLD AUTO: 237 10E9/L (ref 150–450)
POTASSIUM SERPL-SCNC: 3.7 MMOL/L (ref 3.4–5.3)
RBC # BLD AUTO: 4.14 10E12/L (ref 3.8–5.2)
SODIUM SERPL-SCNC: 139 MMOL/L (ref 133–144)
TROPONIN I SERPL-MCNC: <0.015 UG/L (ref 0–0.04)
WBC # BLD AUTO: 7.3 10E9/L (ref 4–11)

## 2021-02-19 PROCEDURE — 80048 BASIC METABOLIC PNL TOTAL CA: CPT | Performed by: EMERGENCY MEDICINE

## 2021-02-19 PROCEDURE — 84484 ASSAY OF TROPONIN QUANT: CPT | Performed by: EMERGENCY MEDICINE

## 2021-02-19 PROCEDURE — 99285 EMERGENCY DEPT VISIT HI MDM: CPT | Mod: 25

## 2021-02-19 PROCEDURE — 250N000009 HC RX 250: Performed by: EMERGENCY MEDICINE

## 2021-02-19 PROCEDURE — 71275 CT ANGIOGRAPHY CHEST: CPT

## 2021-02-19 PROCEDURE — 83880 ASSAY OF NATRIURETIC PEPTIDE: CPT | Performed by: EMERGENCY MEDICINE

## 2021-02-19 PROCEDURE — 85025 COMPLETE CBC W/AUTO DIFF WBC: CPT | Performed by: EMERGENCY MEDICINE

## 2021-02-19 PROCEDURE — 86308 HETEROPHILE ANTIBODY SCREEN: CPT | Performed by: EMERGENCY MEDICINE

## 2021-02-19 PROCEDURE — 250N000012 HC RX MED GY IP 250 OP 636 PS 637: Performed by: EMERGENCY MEDICINE

## 2021-02-19 PROCEDURE — 250N000011 HC RX IP 250 OP 636: Performed by: EMERGENCY MEDICINE

## 2021-02-19 PROCEDURE — 93005 ELECTROCARDIOGRAM TRACING: CPT

## 2021-02-19 RX ORDER — IOPAMIDOL 755 MG/ML
500 INJECTION, SOLUTION INTRAVASCULAR ONCE
Status: COMPLETED | OUTPATIENT
Start: 2021-02-19 | End: 2021-02-19

## 2021-02-19 RX ORDER — PREDNISONE 20 MG/1
20 TABLET ORAL ONCE
Status: COMPLETED | OUTPATIENT
Start: 2021-02-19 | End: 2021-02-19

## 2021-02-19 RX ADMIN — PREDNISONE 20 MG: 20 TABLET ORAL at 13:46

## 2021-02-19 RX ADMIN — SODIUM CHLORIDE 80 ML: 9 INJECTION, SOLUTION INTRAVENOUS at 11:08

## 2021-02-19 RX ADMIN — IOPAMIDOL 70 ML: 755 INJECTION, SOLUTION INTRAVENOUS at 11:08

## 2021-02-19 ASSESSMENT — ENCOUNTER SYMPTOMS
SORE THROAT: 1
COUGH: 1
ABDOMINAL PAIN: 0

## 2021-02-19 NOTE — ED TRIAGE NOTES
Patient presents with 2 weeks of cough, sore throat. Patient has also been coughing so hard throwing up blood. Patient is 18 weeks pregnant. Tested COVID-positive on Sunday, negative yesterday. Concerns for sore throat, cough, and baby.

## 2021-02-19 NOTE — DISCHARGE INSTRUCTIONS
Follow up with primary care provider early next week as well as OB  Make sure that your oxygen level is 94%.   Have your primary care provider monitor your spleen. Probably mildly enlarged from infection

## 2021-02-19 NOTE — ED PROVIDER NOTES
History   Chief Complaint:  Pharyngitis and Cough       HPI   Yamilet Vazquez is a 30 year old female who is 18 weeks pregnant presents with sore throat and cough. The patient reports having one week of sore throat and cough. She was tested positive for covid 5 days ago but had a repeat covid test yesterday which returned negative. She was also tested negative for strep yesterday but was started on amoxicillin. She notes having streaks of blood and mucus from coughing. She also has mild chest pain and feels like she is unable to take a deep breath. She denies any vaginal bleeding or abdominal pain. She is not on blood thinners. She states that she can feel the baby move.     Review of Systems   HENT: Positive for sore throat.    Respiratory: Positive for cough.    Cardiovascular: Positive for chest pain.   Gastrointestinal: Negative for abdominal pain.   Genitourinary: Negative for vaginal bleeding.   All other systems reviewed and are negative.    Allergies:  Flagyl [Metronidazole Hcl]    Medications:  prozac     Past Medical History:    Acute otitis media   ADHD   Anxiety   Arthritis   Asthma   Chronic hypertension   Depressive disorder   History of seizures as a child   Obesity   Psoriasis   Sjogren's syndrome (H)   SS-A antibody positive     Past Surgical History:    T&A   by DIL/EVAC    Family History:    Hypertension     Social History:  The patient presents alone.   PCP: Larisa Toscano MD    Physical Exam     Patient Vitals for the past 24 hrs:   BP Temp Temp src Pulse Resp SpO2 Weight   21 1200 134/87 -- -- 98 18 98 % --   21 1000 (!) 138/90 -- -- 108 18 98 % --   21 0947 -- -- -- -- -- -- 97 kg (213 lb 14.4 oz)   21 0741 (!) 138/97 97.9  F (36.6  C) Oral 110 20 97 % --       Physical Exam    General: Sitting up in bed  Eyes:  The pupils are equal and round    Conjunctivae and sclerae are normal  ENT:    Wearing a mask. Oropharynx clear with no posterior oropharynx  swelling. Mild anterior cervical lymphadenopathy. Voice normal. No trismus  Neck:  Normal range of motion. Mild anterior cervical lymphadenopathy  CV:  Regular rate, regular rhythm     Skin warm and well perfused   Resp:  Non labored breathing on room air    No tachypnea    No cough heard      Dry cough heard occasionally    LUngs clear bilaterally  GI:  Abdomen is soft, there is no rigidity    No distension    No rebound tenderness     No abdominal tenderness    Fundus below umbilicus  MS:  Normal muscular tone  Skin:  No rash or acute skin lesions noted  Neuro:   Awake, alert.      Speech is normal and fluent.    Face is symmetric.     Moves all extremities equally  Psych: Normal affect.  Appropriate interactions.     Emergency Department Course     ECG:  ECG taken at 1141, ECG read at 1151  Sinus rhythm  Normal ECG  Vent. rate 78 BPM  GA interval 140 ms  QRS duration 84 ms  QT/QTc 372/424 ms  P-R-T axes 43 32 11    Imaging:  CT Chest Pulmonary Embolism w Contrast  Preliminary Result  IMPRESSION:  1.  No acute abnormality in the chest. No evidence for pulmonary  embolism.  2.  The spleen is partially included on this exam, but appears at  least mildly enlarged where visualized.  Reading per radiology     Laboratory:  CBC:  WBC 7.3, HGB 13.0, , o/w WNL     BMP: BUN 6 (L), o/w WNL (Creatinine: 0.57)     Troponin(1020):  <0.015      BNP: 52    Mononucleosis screen: negative     Emergency Department Course:    Reviewed:  I reviewed the patient's nursing notes, vitals, past medical records, Care Everywhere.     Assessments:  1000  I performed an exam of the patient as documented above.   1300 Patient rechecked and updated.     Consults:   1243 I spoke with Dr. Henning of the OB/GYN service regarding patient's presentation, findings, and plan of care.      Disposition:  Discharged to home.      Impression & Plan   Covid-19  Yamilet Vazquez was evaluated during a global COVID-19 pandemic, which necessitated  consideration that the patient might be at risk for infection with the SARS-CoV-2 virus that causes COVID-19.   Applicable protocols for evaluation were followed during the patient's care.   COVID-19 was considered as part of the patient's evaluation. The plan for testing is:  a test was obtained at a previous visit and reviewed & considered today.      Medical Decision Making:  Yamilet Vazquez is a 30 year old female who presented to the ED with pharyngitis and cough. Patient with positive covid test. Symptoms consistent with covid. Given coughing up small amount of blood, pregnancy and known covid, higher risk for PE and after discussion with patient, agreed to pursue CT chest PE study. This was negative for PE. Has mild splenomegaly suspected. Likely reactive.  Mono negative. CBC unremarkable today. Minimal amount of hemoptysis and normal hemoglobin, don't think hospitalization is indicated at this time. Discussed this with finding with her and recommended follow-up with PCP. No evidence of more concerning etiology of sore throat such as PTA, epiglottitis, posterior pharyngeal abscess, ludwigs, etc. FHR wnl in ED. No pregnancy complications. She is not hypoxic and breathing in room. Discussed with OB and would like her to follow-up early next week with them and PCP. Get well loop and pulse ox for home ordered for patient. Patient would really like a dose of prednisone to help with her sore throat as this has helped in past. Discussed risks/benefits and she agreed to risks and given one dose in ED. She will follow-up with PCP and OB. Reasons to return to ED discussed with patient.    Diagnosis:    ICD-10-CM    1. 2019 novel coronavirus disease (COVID-19)  U07.1 COVID-19 GetWell Loop Referral     Care Coordination Referral   2. Chest pain, unspecified type  R07.9    3. Hemoptysis  R04.2    4. Suspected COVID-19 virus infection  Z20.822        Scribe Disclosure:  Obey BAUTISTA, am serving as a scribe at 10:00 AM  on 2/19/2021 to document services personally performed by Farideh Rose MD based on my observations and the provider's statements to me.        Farideh Rose MD  02/19/21 [id number]

## 2021-02-20 ENCOUNTER — PATIENT OUTREACH (OUTPATIENT)
Dept: CARE COORDINATION | Facility: CLINIC | Age: 31
End: 2021-02-20

## 2021-02-20 NOTE — TELEPHONE ENCOUNTER
Care Coordination ED Discharge Follow up Note-    Patient referred for Virtual Home Monitoring Program for COVID-19. Called patient to complete assessment, verify or assist with scheduling follow up appointment with patient's PCP, offer Clinic Care Coordination services and ensure patient is engaged with the GetWell Loop. Called patient and left message. RN to attempt again tomorrow

## 2021-02-21 NOTE — TELEPHONE ENCOUNTER
Care Coordination ED Discharge Follow up Note-    Patient referred for Virtual Home Monitoring Program for COVID-19. Called patient to complete assessment, verify or assist with scheduling follow up appointment with patient's PCP, offer Clinic Care Coordination services and ensure patient is engaged with the GetWell Loop. Attempted to reach patient by phone and left voice mail message. Did ask her to schedule followup visits with her PCP and OB early next week. No further outreach attempts will be made to patient at this time.

## 2021-02-22 ENCOUNTER — TELEPHONE (OUTPATIENT)
Dept: MATERNAL FETAL MEDICINE | Facility: CLINIC | Age: 31
End: 2021-02-22

## 2021-02-22 NOTE — TELEPHONE ENCOUNTER
Carlton called [facility] and states she is scheduled for a US on Thursday and was recently in the ER this past weekend with swollen lymph nodes and she was noted to have an enlarged spleen. She states she tested positive for COVID on 2/7/21. She states she is usually prescribed a dose of steroids when she has flares like this with her history of Sjogrens. She called her primary OB and they stated they would give her one dose of steroids but she is unhappy with that and is requesting more. I asked if she has contacted her Rheumatologist and she states her OB is contacting them but she doesn't want to wait all day for a response. She has concerns about her spleen rupturing. She was encouraged to stay in close contact with her OB with any new symptoms or concerns and to keep her appointment with [facility] on Thursday. She asked if we could prescribe her steroids and we discussed that we would be unable to do so as this would need to come from her primary physician. She asked if her appointment with [facility] should be changed. FHT's were done in the ER per patient. We discussed that she should keep her appointment as scheduled and continue to keep her primary OB informed of any new concerns.    Marcia Morton RN

## 2021-02-23 ENCOUNTER — HOSPITAL ENCOUNTER (EMERGENCY)
Facility: CLINIC | Age: 31
Discharge: HOME | End: 2021-02-23
Attending: EMERGENCY MEDICINE | Admitting: EMERGENCY MEDICINE
Payer: COMMERCIAL

## 2021-02-23 ENCOUNTER — APPOINTMENT (OUTPATIENT)
Dept: ULTRASOUND IMAGING | Facility: CLINIC | Age: 31
End: 2021-02-23
Attending: EMERGENCY MEDICINE
Payer: COMMERCIAL

## 2021-02-23 VITALS
RESPIRATION RATE: 16 BRPM | SYSTOLIC BLOOD PRESSURE: 147 MMHG | BODY MASS INDEX: 36.28 KG/M2 | DIASTOLIC BLOOD PRESSURE: 93 MMHG | TEMPERATURE: 97.2 F | WEIGHT: 192 LBS | OXYGEN SATURATION: 98 % | HEART RATE: 87 BPM

## 2021-02-23 DIAGNOSIS — R10.12 LUQ ABDOMINAL PAIN: ICD-10-CM

## 2021-02-23 DIAGNOSIS — M89.8X1 PAIN OF LEFT SCAPULA: ICD-10-CM

## 2021-02-23 DIAGNOSIS — R16.1 SPLENOMEGALY: ICD-10-CM

## 2021-02-23 DIAGNOSIS — R07.89 LEFT-SIDED CHEST WALL PAIN: ICD-10-CM

## 2021-02-23 LAB
ALBUMIN SERPL-MCNC: 2.9 G/DL (ref 3.4–5)
ALBUMIN UR-MCNC: 20 MG/DL
ALP SERPL-CCNC: 40 U/L (ref 40–150)
ALT SERPL W P-5'-P-CCNC: 13 U/L (ref 0–50)
ANION GAP SERPL CALCULATED.3IONS-SCNC: 8 MMOL/L (ref 3–14)
APPEARANCE UR: ABNORMAL
AST SERPL W P-5'-P-CCNC: 11 U/L (ref 0–45)
BACTERIA #/AREA URNS HPF: ABNORMAL /HPF
BASOPHILS # BLD AUTO: 0 10E9/L (ref 0–0.2)
BASOPHILS NFR BLD AUTO: 0.2 %
BILIRUB SERPL-MCNC: 0.3 MG/DL (ref 0.2–1.3)
BILIRUB UR QL STRIP: NEGATIVE
BUN SERPL-MCNC: 6 MG/DL (ref 7–30)
CALCIUM SERPL-MCNC: 9.1 MG/DL (ref 8.5–10.1)
CHLORIDE SERPL-SCNC: 105 MMOL/L (ref 94–109)
CO2 SERPL-SCNC: 24 MMOL/L (ref 20–32)
COLOR UR AUTO: YELLOW
CREAT SERPL-MCNC: 0.53 MG/DL (ref 0.52–1.04)
DIFFERENTIAL METHOD BLD: NORMAL
EOSINOPHIL # BLD AUTO: 0 10E9/L (ref 0–0.7)
EOSINOPHIL NFR BLD AUTO: 0.1 %
ERYTHROCYTE [DISTWIDTH] IN BLOOD BY AUTOMATED COUNT: 12.7 % (ref 10–15)
GFR SERPL CREATININE-BSD FRML MDRD: >90 ML/MIN/{1.73_M2}
GLUCOSE SERPL-MCNC: 79 MG/DL (ref 70–99)
GLUCOSE UR STRIP-MCNC: NEGATIVE MG/DL
HCT VFR BLD AUTO: 38.1 % (ref 35–47)
HGB BLD-MCNC: 13.3 G/DL (ref 11.7–15.7)
HGB UR QL STRIP: NEGATIVE
HYALINE CASTS #/AREA URNS LPF: 1 /LPF (ref 0–2)
IMM GRANULOCYTES # BLD: 0.1 10E9/L (ref 0–0.4)
IMM GRANULOCYTES NFR BLD: 0.6 %
INTERPRETATION ECG - MUSE: NORMAL
KETONES UR STRIP-MCNC: NEGATIVE MG/DL
LEUKOCYTE ESTERASE UR QL STRIP: ABNORMAL
LYMPHOCYTES # BLD AUTO: 1.9 10E9/L (ref 0.8–5.3)
LYMPHOCYTES NFR BLD AUTO: 20.1 %
MCH RBC QN AUTO: 31.2 PG (ref 26.5–33)
MCHC RBC AUTO-ENTMCNC: 34.9 G/DL (ref 31.5–36.5)
MCV RBC AUTO: 89 FL (ref 78–100)
MONOCYTES # BLD AUTO: 0.5 10E9/L (ref 0–1.3)
MONOCYTES NFR BLD AUTO: 5.4 %
MUCOUS THREADS #/AREA URNS LPF: PRESENT /LPF
NEUTROPHILS # BLD AUTO: 6.9 10E9/L (ref 1.6–8.3)
NEUTROPHILS NFR BLD AUTO: 73.6 %
NITRATE UR QL: NEGATIVE
NRBC # BLD AUTO: 0 10*3/UL
NRBC BLD AUTO-RTO: 0 /100
PH UR STRIP: 6.5 PH (ref 5–7)
PLATELET # BLD AUTO: 288 10E9/L (ref 150–450)
POTASSIUM SERPL-SCNC: 4 MMOL/L (ref 3.4–5.3)
PROT SERPL-MCNC: 7.4 G/DL (ref 6.8–8.8)
RBC # BLD AUTO: 4.26 10E12/L (ref 3.8–5.2)
RBC #/AREA URNS AUTO: 1 /HPF (ref 0–2)
SODIUM SERPL-SCNC: 137 MMOL/L (ref 133–144)
SOURCE: ABNORMAL
SP GR UR STRIP: 1.03 (ref 1–1.03)
SQUAMOUS #/AREA URNS AUTO: 20 /HPF (ref 0–1)
TROPONIN I SERPL-MCNC: <0.015 UG/L (ref 0–0.04)
UROBILINOGEN UR STRIP-MCNC: NORMAL MG/DL (ref 0–2)
WBC # BLD AUTO: 9.3 10E9/L (ref 4–11)
WBC #/AREA URNS AUTO: 3 /HPF (ref 0–5)

## 2021-02-23 PROCEDURE — 80053 COMPREHEN METABOLIC PANEL: CPT | Performed by: EMERGENCY MEDICINE

## 2021-02-23 PROCEDURE — 84484 ASSAY OF TROPONIN QUANT: CPT | Performed by: EMERGENCY MEDICINE

## 2021-02-23 PROCEDURE — 93005 ELECTROCARDIOGRAM TRACING: CPT

## 2021-02-23 PROCEDURE — 76705 ECHO EXAM OF ABDOMEN: CPT

## 2021-02-23 PROCEDURE — 99285 EMERGENCY DEPT VISIT HI MDM: CPT | Mod: 25

## 2021-02-23 PROCEDURE — 85025 COMPLETE CBC W/AUTO DIFF WBC: CPT | Performed by: EMERGENCY MEDICINE

## 2021-02-23 PROCEDURE — 81001 URINALYSIS AUTO W/SCOPE: CPT | Performed by: EMERGENCY MEDICINE

## 2021-02-23 ASSESSMENT — ENCOUNTER SYMPTOMS
BACK PAIN: 1
ABDOMINAL PAIN: 1
FEVER: 0

## 2021-02-23 NOTE — ED PROVIDER NOTES
History     Chief Complaint:  Abdominal Pain and 19 weeks pregnant    HPI  Yamilet Vazquez is a 30 year old  female at 19 weeks gestation with a history of anxiety, chronic HTN, and arthritis who presents for evaluation of LUQ abdominal pain. The patient was seen here 2 days ago for cough, shortness of breath, and a sore throat. CT of her chest during that visit revealed a mildly enlarged spleen and so the patient is anxious about this. She now returns with this left sided pain extending into her back which is worsened with deep breathing or twisting of her torso. She has been taking Tylenol for pain with minimal relief. The patient denies any recent fevers or rashes. She did test positive for COVID 16 days ago but since then has tested negative twice.     Review of Systems   Constitutional: Negative for fever.   Gastrointestinal: Positive for abdominal pain.   Musculoskeletal: Positive for back pain.   Skin: Negative for rash.   All other systems reviewed and are negative.    Allergies:  Flagyl [Metronidazole Hcl]    Medications:    Ferrous sulfate  Prozac  Senna    Past Medical History:    Acute otitis media  Single umbilical artery  ADHD  Anxiety  Arthritis  Asthma  Chronic HTN  Depression  Seizures as a child  Obesity  Psoriasis  Sjogren's syndrome  sliding scale-A antibody positive     Past Surgical History:    Induced abortin D&C  Tonsillectomy  Adenoidectomy  Myringotomy  Insert tube bilateral    Family History:    Hypertension in her mother  Thyroid Disease in her father    Social History:  The patient arrives alone  Had a child in July      Physical Exam     Patient Vitals for the past 24 hrs:   BP Temp Temp src Pulse Resp SpO2 Weight   21 1145 (!) 147/93 -- -- 87 -- 98 % --   21 1015 (!) 158/108 -- -- -- -- 97 % --   21 1010 -- -- -- -- -- 99 % --   21 1000 (!) 141/101 -- -- -- -- -- --   21 0928 (!) 153/102 97.2  F (36.2  C) Temporal 119 16 97 % 87.1 kg (192 lb)      Physical Exam  General: Alert, appears well-developed and well-nourished. Cooperative.     In mild distress  HEENT:  Head:  Atraumatic  Ears:  External ears are normal  Mouth/Throat:  Oropharynx is without erythema or exudate and mucous membranes are moist.   Eyes:   Conjunctivae normal and EOM are normal. No scleral icterus.  CV:  Tachycardic rate, regular rhythm, normal heart sounds and radial pulses are 2+ and symmetric.  No murmur.  Resp:  Breath sounds are clear bilaterally    Non-labored, no retractions or accessory muscle use  GI:  Abdomen is soft, no distension, gravid, no tenderness. No rebound or guarding.  No CVA tenderness bilaterally  MS:  Normal range of motion. No edema.    Left chest wall is acutely tender to palpation along the lower left ribs.  She also has acute and exquisite tenderness to the muscles surrounding the left scapula.  There is no overlying rash or deformity or crepitus.    Normal strength in all 4 extremities.     Back atraumatic.    No midline cervical, thoracic, or lumbar tenderness  Skin:  Warm and dry.  No rash or lesions noted.  Neuro: Alert. Normal strength.  GCS: 15  Psych:  Normal mood and affect.    Emergency Department Course     ECG  ECG taken at 0957, ECG read at 1003   Normal sinus rhythm  Normal EKG  Rate 89 bpm. OK interval 130 ms. QRS duration 84 ms. QT/QTc 348/423 ms. P-R-T axes 46 41 20.     Imaging:    US Abdomen limited LUQ  IMPRESSION:  Mild splenomegaly  Reading per radiology    Laboratory:    CBC: (WBC 9.3, HGB 13.3, )   CMP: Glucose 79, Urea Nitrogen: 6 (L), Albumin: 2.9 (L), o/w WNL (Creatinine: 0.53)    Troponin (0950): <0.015     UA: Protein Albumin: 20, Leukocyte Esterase: small, Bacteria: few, Squamous Epithelial: 20 (H) (H), Mucous: Present, o/w Negative    Emergency Department Course:    Reviewed:  I reviewed nursing notes, vitals, past medical history and care everywhere    Assessments:  0934 I obtained history and examined the patient as  noted above.   1015 I rechecked the patient and explained findings. She does not want to have the XR, will do LUQ US instead.      Disposition:  The patient was discharged to home.     Impression & Plan      Medical Decision Making:  Yamilet Vazquez is a 30 year old female approximately 19 weeks pregnant who presents the emergency department with continued left-sided chest wall discomfort and left upper quadrant abdominal pain.  Patient had a broad work-up pursued approximately 4 days ago in the emergency department where she received a CT scan of the chest to evaluate for potential pulmonary embolism and reassuringly this was negative.  An incidental finding on this exam did show a part of the spleen which showed mild enlargement.  Patient is concerned today that the weeks of left chest wall discomfort and left upper quadrant abdominal or flank pain may be secondary to this enlargement of the spleen.  She is very worried about the potential for hematologic malignancies and/or splenomegaly requiring emergent intervention.  We did provide significant reassurance here today as her CBC is grossly unremarkable.  She has no liver or gallbladder dysfunction on blood work and renal function as well as electrolytes appears normal.  We did obtain ultrasound of the left upper quadrant to evaluate both the spleen and kidney.  Reassuringly the spleen appears mildly enlarged and is characterized as mild splenomegaly.  In the setting of current pregnancy, suspicion that this may be related to physiologic changes with a second trimester pregnancy.  There is the chance mild splenomegaly may be secondary to recent Covid infection as well, though she appears to be asymptomatic in regards to this per her report.  We did obtain a urinalysis which had significant squamous epithelium and I suspect that is likely the cause of the few bacteria seen in the urine as well.  Reassuringly she has no urinary complaints negative nitrite few  WBCs and no RBCs.  Lower concern for asymptomatic bacteria, cystitis, pyelonephritis, and kidney stones.  We also repeated a repeat EKG which showed no concerning ischemic changes comparison with prior EKGs.  She had a recent CT pulmonary angiogram I have lower concern for pulmonary embolism particularly as her symptoms have been ongoing for weeks.  Troponin undetectable and low concern for ACS.  Ultimately she needs to be followed closely with her OB/GYN throughout this pregnancy and has an appointment on Thursday for follow-up.  She was reassured by our unremarkable work-up here today.  She understands the importance of close outpatient follow-up with her OB/GYN.   After all questions answered and return precautions were understood, patient was discharged home.      Diagnosis:    ICD-10-CM    1. Left-sided chest wall pain  R07.89 Comprehensive metabolic panel     Troponin I     UA with Microscopic   2. Pain of left scapula  M89.8X1    3. LUQ abdominal pain  R10.12    4. Splenomegaly  R16.1        Discharge Medications:  Discharge Medication List as of 2/23/2021 12:01 PM          Scribe Disclosure:  Shahida BAUTISTA, am serving as a scribe at 9:34 AM on 2/23/2021 to document services personally performed by Kapil Arriola MD based on my observations and the provider's statements to me.    [facility] EMERGENCY DEPT     Kapil Arriola MD  02/23/21 0041

## 2021-02-23 NOTE — ED TRIAGE NOTES
Patient seen here on Sunday for enlarged lymph nodes, found to have an enlarged spleen, she report worsening left sided abdominal pain. She is 19 weeks pregnant. Labor and deliver charge nurse Leslie BALDERRAMA is aware of patient starting in the ED and can monitor as needed. Patient is also 16 days post positive covid test with 2 negative tests since.

## 2021-02-25 ENCOUNTER — HOSPITAL ENCOUNTER (OUTPATIENT)
Dept: ULTRASOUND IMAGING | Facility: CLINIC | Age: 31
End: 2021-02-25
Attending: OBSTETRICS & GYNECOLOGY
Payer: COMMERCIAL

## 2021-02-25 ENCOUNTER — OFFICE VISIT (OUTPATIENT)
Dept: MATERNAL FETAL MEDICINE | Facility: CLINIC | Age: 31
End: 2021-02-25
Attending: OBSTETRICS & GYNECOLOGY
Payer: COMMERCIAL

## 2021-02-25 VITALS — HEART RATE: 100 BPM | SYSTOLIC BLOOD PRESSURE: 148 MMHG | RESPIRATION RATE: 18 BRPM | DIASTOLIC BLOOD PRESSURE: 103 MMHG

## 2021-02-25 DIAGNOSIS — R76.8 SS-A ANTIBODY POSITIVE: ICD-10-CM

## 2021-02-25 DIAGNOSIS — O10.419 PRE-EXISTING SECONDARY HYPERTENSION DURING PREGNANCY, ANTEPARTUM: ICD-10-CM

## 2021-02-25 DIAGNOSIS — R76.8 SS-A ANTIBODY POSITIVE: Primary | ICD-10-CM

## 2021-02-25 DIAGNOSIS — O99.212 OBESITY AFFECTING PREGNANCY IN SECOND TRIMESTER: ICD-10-CM

## 2021-02-25 DIAGNOSIS — O10.919 CHRONIC HYPERTENSION AFFECTING PREGNANCY: ICD-10-CM

## 2021-02-25 PROCEDURE — 76811 OB US DETAILED SNGL FETUS: CPT | Mod: 26 | Performed by: OBSTETRICS & GYNECOLOGY

## 2021-02-25 PROCEDURE — 76828 ECHO EXAM OF FETAL HEART: CPT

## 2021-02-25 PROCEDURE — 99212 OFFICE O/P EST SF 10 MIN: CPT | Mod: 25 | Performed by: OBSTETRICS & GYNECOLOGY

## 2021-02-26 NOTE — PROGRESS NOTES
Yamilet Vazquez was seen for an ultrasound today at the Maternal-Fetal Medicine center.      For the details of the ultrasound please see the report which can be found under the imaging tab.      Radha Andrade MD  , OB/GYN  Maternal-Fetal Medicine  [email or web address]  [phone] (Main MFM Office)  [id number] or [phone] (for 24 hour MFM questions)  [phone] (Pager)

## 2021-03-10 ENCOUNTER — OFFICE VISIT (OUTPATIENT)
Dept: MATERNAL FETAL MEDICINE | Facility: CLINIC | Age: 31
End: 2021-03-10
Attending: OBSTETRICS & GYNECOLOGY
Payer: COMMERCIAL

## 2021-03-10 ENCOUNTER — HOSPITAL ENCOUNTER (OUTPATIENT)
Dept: ULTRASOUND IMAGING | Facility: CLINIC | Age: 31
End: 2021-03-10
Attending: OBSTETRICS & GYNECOLOGY
Payer: COMMERCIAL

## 2021-03-10 DIAGNOSIS — R76.8 SS-A ANTIBODY POSITIVE: Primary | ICD-10-CM

## 2021-03-10 DIAGNOSIS — R76.8 SS-A ANTIBODY POSITIVE: ICD-10-CM

## 2021-03-10 PROCEDURE — 76828 ECHO EXAM OF FETAL HEART: CPT

## 2021-03-10 PROCEDURE — 76815 OB US LIMITED FETUS(S): CPT | Mod: 26 | Performed by: OBSTETRICS & GYNECOLOGY

## 2021-03-10 NOTE — PROGRESS NOTES
Please see full imaging report from [facility] program under imaging tab.    Findings reviewed with Ali today. She reports that she currently has food poisoning, which she and her  have both had after eating in a restaurant earlier this week. She has been vomiting for > 24 hours. Denies COVID-19 symptoms; was diagnosed with COVID-19 previously in mid February.     Encouraged to rest and push electrolyte based fluids, and to have further evaluation if she is unable to keep liquids down and is not improving today. Primary OB team at [facility] contacted to update on patient status. They will reach out to her later today to check on how she is doing.     She has declined evaluation for fetal heart block with pediatric cardiology (see 2/25/21 note) and will see MFM every 2 weeks.     Mamadou French MD  Maternal Fetal Medicine

## 2021-03-24 ENCOUNTER — OFFICE VISIT (OUTPATIENT)
Dept: MATERNAL FETAL MEDICINE | Facility: CLINIC | Age: 31
End: 2021-03-24
Attending: OBSTETRICS & GYNECOLOGY
Payer: COMMERCIAL

## 2021-03-24 ENCOUNTER — HOSPITAL ENCOUNTER (OUTPATIENT)
Dept: ULTRASOUND IMAGING | Facility: CLINIC | Age: 31
End: 2021-03-24
Attending: OBSTETRICS & GYNECOLOGY
Payer: COMMERCIAL

## 2021-03-24 VITALS — DIASTOLIC BLOOD PRESSURE: 78 MMHG | SYSTOLIC BLOOD PRESSURE: 117 MMHG | HEART RATE: 83 BPM

## 2021-03-24 DIAGNOSIS — O10.919 CHRONIC HYPERTENSION AFFECTING PREGNANCY: Primary | ICD-10-CM

## 2021-03-24 DIAGNOSIS — O99.212 OBESITY AFFECTING PREGNANCY IN SECOND TRIMESTER: ICD-10-CM

## 2021-03-24 DIAGNOSIS — R76.8 SS-A ANTIBODY POSITIVE: ICD-10-CM

## 2021-03-24 PROCEDURE — 76816 OB US FOLLOW-UP PER FETUS: CPT

## 2021-03-24 PROCEDURE — 76828 ECHO EXAM OF FETAL HEART: CPT

## 2021-03-24 PROCEDURE — 76816 OB US FOLLOW-UP PER FETUS: CPT | Mod: 26 | Performed by: OBSTETRICS & GYNECOLOGY

## 2021-03-24 NOTE — NURSING NOTE
Patient here for [facility] ultrasound. Patient c/o daily headaches with occasional stars in her vision. Patient concerned she may have elevated B/P, has not been checking at home. B/P taken x3 as documented and all within normal limits. Patient denies any epigastric pain or edema. Dr. French updated and spoke with patient following ultrasound. Per the request of Dr. French, this RN reached out to [facility] at [phone] and spoke with CONCHIS Morton. Advised of above and that Dr. French has asked patient to check B/P once per day for the next few days and also to eat a snack mid afternoon when her HA's typically onset, Dr. French would like [facility] to reach out to patient on Monday to f/u on blood pressures at home to determine plan for care, meds, etc. Selene agreeable to plan.

## 2021-04-07 ENCOUNTER — HOSPITAL ENCOUNTER (OUTPATIENT)
Dept: ULTRASOUND IMAGING | Facility: CLINIC | Age: 31
End: 2021-04-07
Attending: OBSTETRICS & GYNECOLOGY
Payer: COMMERCIAL

## 2021-04-07 ENCOUNTER — OFFICE VISIT (OUTPATIENT)
Dept: MATERNAL FETAL MEDICINE | Facility: CLINIC | Age: 31
End: 2021-04-07
Attending: OBSTETRICS & GYNECOLOGY
Payer: COMMERCIAL

## 2021-04-07 VITALS — HEART RATE: 88 BPM | DIASTOLIC BLOOD PRESSURE: 85 MMHG | SYSTOLIC BLOOD PRESSURE: 138 MMHG

## 2021-04-07 DIAGNOSIS — R76.8 SS-A ANTIBODY POSITIVE: ICD-10-CM

## 2021-04-07 DIAGNOSIS — R76.8 SS-A ANTIBODY POSITIVE: Primary | ICD-10-CM

## 2021-04-07 PROCEDURE — 76815 OB US LIMITED FETUS(S): CPT | Mod: 26 | Performed by: OBSTETRICS & GYNECOLOGY

## 2021-04-07 PROCEDURE — 76815 OB US LIMITED FETUS(S): CPT

## 2021-04-07 NOTE — PROGRESS NOTES
Yamilet Vazquez was seen for an ultrasound today at the Maternal-Fetal Medicine center.      For the details of the ultrasound please see the report which can be found under the imaging tab.      Radha Andrade MD  , OB/GYN  Maternal-Fetal Medicine  [email or web address]  [phone] (Main MFM Office)  [phone] or [phone] (for 24 hour MFM questions)  [phone] (Pager)

## 2021-04-13 ENCOUNTER — PRE VISIT (OUTPATIENT)
Dept: MATERNAL FETAL MEDICINE | Facility: CLINIC | Age: 31
End: 2021-04-13
Payer: COMMERCIAL

## 2021-04-13 ENCOUNTER — TELEPHONE (OUTPATIENT)
Dept: MATERNAL FETAL MEDICINE | Facility: CLINIC | Age: 31
End: 2021-04-13

## 2021-04-13 DIAGNOSIS — Z53.9 ERRONEOUS ENCOUNTER--DISREGARD: Primary | ICD-10-CM

## 2021-04-13 NOTE — TELEPHONE ENCOUNTER
Carlton called and states she tested positive for COVID on 4/8/21. Her symptoms began that day. Currently her only symptom is nasal congestion. Situation reviewed with Dr. Castano. Plan to r/s appointment from 4/15/21 to 4/19/21 (>10 days from symptoms onset and positive test result). Appointment scheduled for 4/19/21 at 1330 at [facility]. Patient appreciates POC.    Marcia Morton RN

## 2021-04-19 ENCOUNTER — OFFICE VISIT (OUTPATIENT)
Dept: MATERNAL FETAL MEDICINE | Facility: CLINIC | Age: 31
End: 2021-04-19
Attending: OBSTETRICS & GYNECOLOGY
Payer: COMMERCIAL

## 2021-04-19 ENCOUNTER — HOSPITAL ENCOUNTER (OUTPATIENT)
Dept: ULTRASOUND IMAGING | Facility: CLINIC | Age: 31
End: 2021-04-19
Attending: OBSTETRICS & GYNECOLOGY
Payer: COMMERCIAL

## 2021-04-19 DIAGNOSIS — O35.BXX0 HEART BLOCK, FETAL, AFFECTING CARE OF MOTHER: ICD-10-CM

## 2021-04-19 DIAGNOSIS — R76.8 SS-A ANTIBODY POSITIVE: ICD-10-CM

## 2021-04-19 DIAGNOSIS — J12.82 PNEUMONIA DUE TO 2019 NOVEL CORONAVIRUS: Primary | ICD-10-CM

## 2021-04-19 DIAGNOSIS — O10.919 CHRONIC HYPERTENSION AFFECTING PREGNANCY: ICD-10-CM

## 2021-04-19 DIAGNOSIS — U07.1 PNEUMONIA DUE TO 2019 NOVEL CORONAVIRUS: Primary | ICD-10-CM

## 2021-04-19 PROCEDURE — 76828 ECHO EXAM OF FETAL HEART: CPT

## 2021-04-19 PROCEDURE — 76816 OB US FOLLOW-UP PER FETUS: CPT

## 2021-04-19 PROCEDURE — 76816 OB US FOLLOW-UP PER FETUS: CPT | Mod: 26 | Performed by: OBSTETRICS & GYNECOLOGY

## 2021-04-19 NOTE — NURSING NOTE
Ali called this morning stating that she was seen in the ER yesterday for continuous chest pain and ongoing COVID symptoms. Denies fever. Positive for cough and nasal congestion. States she was diagnosed with pneumonia, was given IV antibiotics in the ER and is currently on PO antibiotics. Situation reviewed with Dr French. She tested positive for COVID on 4/8/21 and is out of the 10 day window but still symptomatic (with increased symptoms since last week). Patient was r/s to be seen at 1145 (instead of 1330 to allow clinic time for proper cleaning) and staff applied full PPE (gown, gloves, N95, eye shield) for visit.     Marcia Morton RN

## 2021-05-03 ENCOUNTER — HOSPITAL ENCOUNTER (OUTPATIENT)
Facility: CLINIC | Age: 31
End: 2021-05-03
Admitting: OBSTETRICS & GYNECOLOGY
Payer: COMMERCIAL

## 2021-06-22 ENCOUNTER — HOSPITAL ENCOUNTER (OUTPATIENT)
Facility: CLINIC | Age: 31
Discharge: HOME | End: 2021-06-22
Attending: OBSTETRICS & GYNECOLOGY | Admitting: OBSTETRICS & GYNECOLOGY
Payer: COMMERCIAL

## 2021-06-22 VITALS
WEIGHT: 206 LBS | BODY MASS INDEX: 38.89 KG/M2 | SYSTOLIC BLOOD PRESSURE: 138 MMHG | HEIGHT: 61 IN | TEMPERATURE: 98 F | DIASTOLIC BLOOD PRESSURE: 84 MMHG

## 2021-06-22 PROBLEM — O09.90 SUPERVISION OF HIGH-RISK PREGNANCY: Status: ACTIVE | Noted: 2021-06-22

## 2021-06-22 LAB
ALT SERPL W P-5'-P-CCNC: 16 U/L (ref 0–50)
AST SERPL W P-5'-P-CCNC: 19 U/L (ref 0–45)
CREAT SERPL-MCNC: 0.53 MG/DL (ref 0.52–1.04)
CREAT UR-MCNC: 49 MG/DL
ERYTHROCYTE [DISTWIDTH] IN BLOOD BY AUTOMATED COUNT: 13.6 % (ref 10–15)
GFR SERPL CREATININE-BSD FRML MDRD: >90 ML/MIN/{1.73_M2}
HCT VFR BLD AUTO: 35.2 % (ref 35–47)
HGB BLD-MCNC: 12.2 G/DL (ref 11.7–15.7)
MCH RBC QN AUTO: 32.2 PG (ref 26.5–33)
MCHC RBC AUTO-ENTMCNC: 34.7 G/DL (ref 31.5–36.5)
MCV RBC AUTO: 93 FL (ref 78–100)
PLATELET # BLD AUTO: 197 10E9/L (ref 150–450)
PROT UR-MCNC: 0.13 G/L
PROT/CREAT 24H UR: 0.26 G/G CR (ref 0–0.2)
RBC # BLD AUTO: 3.79 10E12/L (ref 3.8–5.2)
WBC # BLD AUTO: 9.2 10E9/L (ref 4–11)

## 2021-06-22 PROCEDURE — 82565 ASSAY OF CREATININE: CPT | Performed by: OBSTETRICS & GYNECOLOGY

## 2021-06-22 PROCEDURE — 85027 COMPLETE CBC AUTOMATED: CPT | Performed by: OBSTETRICS & GYNECOLOGY

## 2021-06-22 PROCEDURE — G0463 HOSPITAL OUTPT CLINIC VISIT: HCPCS

## 2021-06-22 PROCEDURE — 84460 ALANINE AMINO (ALT) (SGPT): CPT | Performed by: OBSTETRICS & GYNECOLOGY

## 2021-06-22 PROCEDURE — 36415 COLL VENOUS BLD VENIPUNCTURE: CPT | Performed by: OBSTETRICS & GYNECOLOGY

## 2021-06-22 PROCEDURE — 84450 TRANSFERASE (AST) (SGOT): CPT | Performed by: OBSTETRICS & GYNECOLOGY

## 2021-06-22 PROCEDURE — 84156 ASSAY OF PROTEIN URINE: CPT | Performed by: OBSTETRICS & GYNECOLOGY

## 2021-06-22 RX ORDER — PRENATAL VIT/IRON FUM/FOLIC AC 27MG-0.8MG
1 TABLET ORAL DAILY
COMMUNITY

## 2021-06-22 RX ORDER — LABETALOL 200 MG/1
200 TABLET, FILM COATED ORAL DAILY
COMMUNITY

## 2021-06-22 RX ORDER — ASPIRIN 81 MG/1
81 TABLET, CHEWABLE ORAL DAILY
Status: ON HOLD | COMMUNITY
End: 2021-07-08

## 2021-06-22 RX ORDER — ONDANSETRON 2 MG/ML
4 INJECTION INTRAMUSCULAR; INTRAVENOUS EVERY 6 HOURS PRN
Status: DISCONTINUED | OUTPATIENT
Start: 2021-06-22 | End: 2021-06-22 | Stop reason: HOSPADM

## 2021-06-22 ASSESSMENT — MIFFLIN-ST. JEOR: SCORE: 1581.79

## 2021-06-22 NOTE — DISCHARGE INSTRUCTIONS
Discharge Instruction for Undelivered Patients      You were seen for: Fetal Assessment and Blood Pressure check  We Consulted: Dr. Uribe  You had (Test or Medicine): Fetal NST (non stress test), and serial blood pressure checks.      Diet:   Drink 8 to 12 glasses of liquids (milk, juice, water) every day.  You may eat meals and snacks.     Activity:  Call your doctor or nurse midwife if your baby is moving less than usual.     Call your provider if you notice:  Swelling in your face or increased swelling in your hands or legs.  Headaches that are not relieved by Tylenol (acetaminophen).  Changes in your vision (blurring: seeing spots or stars.)  Nausea (sick to your stomach) and vomiting (throwing up).   Weight gain of 5 pounds or more per week.  Heartburn that doesn't go away.  Signs of bladder infection: pain when you urinate (use the toilet), need to go more often and more urgently.  The bag of jalloh (rupture of membranes) breaks, or you notice leaking in your underwear.  Bright red blood in your underwear.  Abdominal (lower belly) or stomach pain.  For first baby: Contractions (tightening) less than 5 minutes apart for one hour or more.  Second (plus) baby: Contractions (tightening) less than 10 minutes apart and getting stronger.  *If less than 34 weeks: Contractions (tightenings) more than 6 times in one hour.  Increase or change in vaginal discharge (note the color and amount)  Other: Stop checking blood pressures at home. If you are symptomatic (headache, blurred/spotted vision, swelling, etc) then you should call your clinic for an official blood pressure check.     Follow-up:  As scheduled in the clinic on Thursday. Call if anything changes between now and then with your symptoms.

## 2021-06-22 NOTE — PROVIDER NOTIFICATION
06/22/21 5030   Provider Notification   Provider Name/Title MOLLY   Method of Notification Phone   Request Evaluate - Remote   Notification Reason Patient Arrived   Dr Corazon Uribe informed of patient arrival and assessment including the following:  Patient is CHTN controlled on Labetalol 200 mg oral twice daily.  She has been monitoring her blood pressure at home and stated that her pressure was 158/133 today.  Ali had visual spots yesterday.  She stated that a headache started around 1300 but she has not self medicated.  She said the pain is mild-noticeable.Upon admission, Angelia stated that the infant was transverse 2 weeks ago but last week was vertex.  Leopold revealed the infant to be vertex but not engaged.  EFM was explained and applied.  No SVE was done.  She denies LOF & VB.    Reason for maternal/fetal assessment elevated blood pressures. Fetal status normal baseline, moderate variability, accelerations present and intermittent variable decelerations. Plan per provider/orders received for Per eclampsia labs, serial BP & reflexes.   Bedside handoff given to Venecia BALDERRAMA and cares turned over at 1525.

## 2021-06-22 NOTE — PROVIDER NOTIFICATION
06/22/21 6781   Provider Notification   Provider Name/Title Jojo   Method of Notification Phone     Informed of all lab values. New order to send pt home, to continue with her follow up on Thursday in clinic as scheduled. Stop checking BP's at home, but if symptomatic to call the office for an official BP check.

## 2021-06-22 NOTE — PROVIDER NOTIFICATION
"   06/22/21 4796   Provider Notification   Provider Name/Title Jojo   Method of Notification Phone   Request Evaluate - Remote     Updated provider. BP on the pt's cuff significantly higher than hospital cuff. Pt denies headache, spotty vision, any other PreE sx. Pt did admit she'd like to just be induced today \"why not if they're going to induce me in a week or two\". Discussed the differences between a 35/36 vs 37/38/39 week baby, and after discussing this both the pt and her  are very excited to be able to be likely going home today and waiting to meet their baby for a little longer.   New order to stop continuous monitoring, will wait for AST/ALT and call Alexeysandra with results.   "

## 2021-06-24 ENCOUNTER — HOSPITAL ENCOUNTER (INPATIENT)
Facility: CLINIC | Age: 31
Setting detail: SURGERY ADMIT
End: 2021-06-24
Attending: OBSTETRICS & GYNECOLOGY | Admitting: OBSTETRICS & GYNECOLOGY
Payer: COMMERCIAL

## 2021-06-24 DIAGNOSIS — Z11.59 ENCOUNTER FOR SCREENING FOR OTHER VIRAL DISEASES: ICD-10-CM

## 2021-06-24 RX ORDER — CEFAZOLIN SODIUM 2 G/100ML
2 INJECTION, SOLUTION INTRAVENOUS
Status: CANCELLED | OUTPATIENT
Start: 2021-06-24

## 2021-06-24 RX ORDER — SODIUM CHLORIDE, SODIUM LACTATE, POTASSIUM CHLORIDE, CALCIUM CHLORIDE 600; 310; 30; 20 MG/100ML; MG/100ML; MG/100ML; MG/100ML
INJECTION, SOLUTION INTRAVENOUS CONTINUOUS
Status: CANCELLED | OUTPATIENT
Start: 2021-06-24

## 2021-06-24 RX ORDER — CITRIC ACID/SODIUM CITRATE 334-500MG
30 SOLUTION, ORAL ORAL
Status: CANCELLED | OUTPATIENT
Start: 2021-06-24

## 2021-06-24 RX ORDER — CEFAZOLIN SODIUM 1 G/3ML
1 INJECTION, POWDER, FOR SOLUTION INTRAMUSCULAR; INTRAVENOUS SEE ADMIN INSTRUCTIONS
Status: CANCELLED | OUTPATIENT
Start: 2021-06-24

## 2021-06-28 LAB — GROUP B STREP PCR: NEGATIVE

## 2021-07-03 NOTE — PHARMACY-ADMISSION MEDICATION HISTORY
Medication reconciliation interview completed by pre-admitting nurse Alethea Lobo, reviewed by pharmacy. No further clarifications needed.       Prior to Admission medications    Medication Sig Last Dose Taking? Auth Provider   acetaminophen (TYLENOL) 325 MG tablet Take 3 tablets (975 mg) by mouth every 6 hours as needed for mild pain or fever (greater than or equal to 38  C /100.4  F (oral) or 38.5  C/ 101.4  F (core).)  Yes Corazon Uribe MD   aspirin (ASA) 81 MG chewable tablet Take 81 mg by mouth daily  Yes Reported, Patient   FLUoxetine (PROZAC) 10 MG capsule Take 10 mg by mouth daily  Yes Reported, Patient   labetalol (NORMODYNE) 200 MG tablet Take 200 mg by mouth 2 times daily  Yes Reported, Patient   Prenatal Vit-Fe Fumarate-FA (PRENATAL MULTIVITAMIN W/IRON) 27-0.8 MG tablet Take 1 tablet by mouth daily  Yes Reported, Patient   senna-docusate (SENOKOT-S/PERICOLACE) 8.6-50 MG tablet Take 1 tablet by mouth 2 times daily as needed for constipation  Yes Corazon Uribe MD

## 2021-07-05 DIAGNOSIS — Z11.59 ENCOUNTER FOR SCREENING FOR OTHER VIRAL DISEASES: ICD-10-CM

## 2021-07-05 LAB
LABORATORY COMMENT REPORT: ABNORMAL
SARS-COV-2 RNA RESP QL NAA+PROBE: NORMAL
SARS-COV-2 RNA RESP QL NAA+PROBE: POSITIVE
SPECIMEN SOURCE: ABNORMAL
SPECIMEN SOURCE: NORMAL

## 2021-07-05 PROCEDURE — U0003 INFECTIOUS AGENT DETECTION BY NUCLEIC ACID (DNA OR RNA); SEVERE ACUTE RESPIRATORY SYNDROME CORONAVIRUS 2 (SARS-COV-2) (CORONAVIRUS DISEASE [COVID-19]), AMPLIFIED PROBE TECHNIQUE, MAKING USE OF HIGH THROUGHPUT TECHNOLOGIES AS DESCRIBED BY CMS-2020-01-R: HCPCS | Performed by: OBSTETRICS & GYNECOLOGY

## 2021-07-05 PROCEDURE — U0005 INFEC AGEN DETEC AMPLI PROBE: HCPCS | Performed by: OBSTETRICS & GYNECOLOGY

## 2021-07-06 ENCOUNTER — TELEPHONE (OUTPATIENT)
Dept: LAB | Facility: CLINIC | Age: 31
End: 2021-07-06

## 2021-07-06 NOTE — TELEPHONE ENCOUNTER
"-Coronavirus (COVID-19) Notification    Caller Name (Patient, parent, daughter/son, grandparent, etc)  Patient  Patient reports I was positive in February and I do not have Sx.  I am scheduled for a  tomorrow due to my  B/P.    Patient agrees to call her procedure provider for follow up instructions.    Patient denied needing the following information and was briefed on some of the information (how to care for myself and when to call 911).     Reason for call  Notify of Positive Coronavirus (COVID-19) lab results, assess symptoms,  review [facility] recommendations    Lab Result    Lab test:  2019-nCoV rRt-PCR or SARS-CoV-2 PCR    Oropharyngeal AND/OR nasopharyngeal swabs is POSITIVE for 2019-nCoV RNA/SARS-COV-2 PCR (COVID-19 virus)    RN Recommendations/Instructions per [facility] Coronavirus COVID-19 recommendations    Brief introduction script  Introduce self then review script:  \"I am calling on behalf of [employer].  We were notified that your Coronavirus test (COVID-19) for was POSITIVE for the virus.  I have some information to relay to you but first I wanted to mention that the [place] Dept of Health will be contacting you shortly [it's possible MD already called Patient] to talk to you more about how you are feeling and other people you have had contact with who might now also have the virus.  Also,  [employer] is Partnering with the [facility] for Covid-19 research, you may be contacted directly by research staff.\"    Assessment (Inquire about Patient's current symptoms)   Assessment   Current Symptoms at time of phone call: (if no symptoms, document No symptoms] none   Symptoms onset (if applicable) Tested 2021 for      If at time of call, Patients symptoms hare worsened, the Patient should contact 911 or have someone drive them to Emergency Dept promptly:      If Patient calling 911, inform 911 personal that you have tested positive for the Coronavirus " "(COVID-19).  Place mask on and await 911 to arrive.    If Emergency Dept, If possible, please have another adult drive you to the Emergency Dept but you need to wear mask when in contact with other people.      Monoclonal Antibody Administration    You may be eligible to receive a new treatment with a monoclonal antibody for preventing hospitalization in patients at high risk for complications from COVID-19.   This medication is still experimental and available on a limited basis; it is given through an IV and must be given at an infusion center. Please note that not all people who are eligible will receive the medication since it is in limited supply.     Are you interested in being considered for this medication?  No.   Does the patient fit the criteria: No    If patient qualifies based on above criteria:  \"You will be contacted if you are selected to receive this treatment in the next 1-2 business days.   This is time sensitive and if you are not selected in the next 1-2 business days, you will not receive the medication.  If you do not receive a call to schedule, you have not been selected.\"      Review information with Patient    Your result was positive. This means you have COVID-19 (coronavirus).  We have sent you a letter that reviews the information that I'll be reviewing with you now.    How can I protect others?    If you have symptoms: stay home and away from others (self-isolate) until:    You've had no fever--and no medicine that reduces fever--for 1 full day (24 hours). And       Your other symptoms have gotten better. For example, your cough or breathing has improved. And     At least 10 days have passed since your symptoms started. (If you've been told by a doctor that you have a weak immune system, wait 20 days.)     If you don't have symptoms: Stay home and away from others (self-isolate) until at least 10 days have passed since your first positive COVID-19 test. (Date test collected)    During " this time:    Stay in your own room, including for meals. Use your own bathroom if you can.    Stay away from others in your home. No hugging, kissing or shaking hands. No visitors.     Don't go to work, school or anywhere else.     Clean  high touch  surfaces often (doorknobs, counters, handles, etc.). Use a household cleaning spray or wipes. You'll find a full list on the EPA website at www.epa.gov/pesticide-registration/list-n-disinfectants-use-against-sars-cov-2.     Cover your mouth and nose with a mask, tissue or other face covering to avoid spreading germs.    Wash your hands and face often with soap and water.    Make a list of people you have been in close contact with recently, even if either of you wore a face covering.   ; Start your list from 2 days before you became ill or had a positive test.  ; Include anyone that was within 6 feet of you for a cumulative total of 15 minutes or more in 24 hours. (Example: if you sat next to Nakul for 5 minutes in the morning and 10 minutes in the afternoon, then you were in close contact for 15 minutes total that day. Nakul would be added to your list.)    A public health worker will call or text you. It is important that you answer. They will ask you questions about possible exposures to COVID-19, such as people you have been in direct contact with and places you have visited.    Tell the people on your list that you have COVID-19; they should stay away from others for 14 days starting from the last time they were in contact with you (unless you are told something different from a public health worker).     Caregivers in these groups are at risk for severe illness due to COVID-19:  o People 65 years and older  o People who live in a nursing home or long-term care facility  o People with chronic disease (lung, heart, cancer, diabetes, kidney, liver, immunologic)  o People who have a weakened immune system, including those who:  - Are in cancer treatment  - Take  medicine that weakens the immune system, such as corticosteroids  - Had a bone marrow or organ transplant  - Have an immune deficiency  - Have poorly controlled HIV or AIDS  - Are obese (body mass index of 40 or higher)  - Smoke regularly    Caregivers should wear gloves while washing dishes, handling laundry and cleaning bedrooms and bathrooms.    Wash and dry laundry with special caution. Don't shake dirty laundry, and use the warmest water setting you can.    If you have a weakened immune system, ask your doctor about other actions you should take.    For more tips, go to www.cdc.gov/coronavirus/2019-ncov/downloads/10Things.pdf.    You should not go back to work until you meet the guidelines above for ending your home isolation. You don't need to be retested for COVID-19 before going back to work--studies show that you won't spread the virus if it's been at least 10 days since your symptoms started (or 20 days, if you have a weak immune system).    Employers: This document serves as formal notice of your employee's medical guidelines for going back to work. They must meet the above guidelines before going back to work in person.    How can I take care of myself?    1. Get lots of rest. Drink extra fluids (unless a doctor has told you not to).    2. Take Tylenol (acetaminophen) for fever or pain. If you have liver or kidney problems, ask your family doctor if it's okay to take Tylenol.     Take either:     650 mg (two 325 mg pills) every 4 to 6 hours, or     1,000 mg (two 500 mg pills) every 8 hours as needed.     Note: Don't take more than 3,000 mg in one day. Acetaminophen is found in many medicines (both prescribed and over-the-counter medicines). Read all labels to be sure you don't take too much.    For children, check the Tylenol bottle for the right dose (based on their age or weight).    3. If you have other health problems (like cancer, heart failure, an organ transplant or severe kidney disease): Call  your specialty clinic if you don't feel better in the next 2 days.    4. Know when to call 911: Emergency warning signs include:    Trouble breathing or shortness of breath    Pain or pressure in the chest that doesn't go away    Feeling confused like you haven't felt before, or not being able to wake up    Bluish-colored lips or face    5. Sign up for [employer]. We know it's scary to hear that you have COVID-19. We want to track your symptoms to make sure you're okay over the next 2 weeks. Please look for an email from [employer]--this is a free, online program that we'll use to keep in touch. To sign up, follow the link in the email. Learn more at [email or web address].    Where can I get more information?    [facility]: [email or web address]    Coronavirus Basics: www.health.[facility].[place]/diseases/coronavirus/basics.html    What to Do If You're Sick: www.cdc.gov/coronavirus/2019-ncov/about/steps-when-sick.html    Ending Home Isolation: www.cdc.gov/coronavirus/2019-ncov/hcp/disposition-in-home-patients.html     Caring for Someone with COVID-19: www.cdc.gov/coronavirus/2019-ncov/if-you-are-sick/care-for-someone.html     [facility] clinical trials (COVID-19 research studies): clinicalaffairs.[facility]-clinical-trials     A Positive COVID-19 letter will be sent via [employer] or the mail. (Exception, no letters sent to Presurgerical/Preprocedure Patients)    Kinga Constantino LPN

## 2021-07-06 NOTE — TELEPHONE ENCOUNTER
Coronavirus (COVID-19) Notification    Reason for call  Notify of POSITIVE  COVID-19 lab result, assess symptoms,  review [facility] recommendations    Lab Result   Lab test for 2019-nCoV rRt-PCR or SARS-COV-2 PCR  Oropharyngeal AND/OR nasopharyngeal swabs were POSITIVE for 2019-nCoV RNA [OR] SARS-COV-2 RNA (COVID-19) RNA     We have been unable to reach Patient by phone at this time to notify of their Positive COVID-19 result.  Left voicemail message requesting a call back to [phone] [facility] for results.        POSITIVE COVID-19 Letter sent.    Oly Rodriguez LPN

## 2021-07-07 ENCOUNTER — ANESTHESIA EVENT (OUTPATIENT)
Dept: OBGYN | Facility: CLINIC | Age: 31
End: 2021-07-07
Payer: COMMERCIAL

## 2021-07-07 ENCOUNTER — HOSPITAL ENCOUNTER (INPATIENT)
Facility: CLINIC | Age: 31
LOS: 1 days | Discharge: HOME | End: 2021-07-08
Attending: OBSTETRICS & GYNECOLOGY | Admitting: OBSTETRICS & GYNECOLOGY
Payer: COMMERCIAL

## 2021-07-07 ENCOUNTER — ANESTHESIA (OUTPATIENT)
Dept: OBGYN | Facility: CLINIC | Age: 31
End: 2021-07-07
Payer: COMMERCIAL

## 2021-07-07 PROBLEM — Z34.90 PREGNANCY: Status: ACTIVE | Noted: 2021-07-07

## 2021-07-07 LAB
ABO + RH BLD: ABNORMAL
ABO + RH BLD: ABNORMAL
ALBUMIN SERPL-MCNC: 2.6 G/DL (ref 3.4–5)
ALP SERPL-CCNC: 65 U/L (ref 40–150)
ALT SERPL W P-5'-P-CCNC: 19 U/L (ref 0–50)
ANION GAP SERPL CALCULATED.3IONS-SCNC: 8 MMOL/L (ref 3–14)
AST SERPL W P-5'-P-CCNC: 21 U/L (ref 0–45)
BASOPHILS # BLD AUTO: 0 10E9/L (ref 0–0.2)
BASOPHILS NFR BLD AUTO: 0.1 %
BILIRUB SERPL-MCNC: 0.4 MG/DL (ref 0.2–1.3)
BLD GP AB INVEST PLASRBC-IMP: ABNORMAL
BLD GP AB SCN SERPL QL: ABNORMAL
BLOOD BANK CMNT PATIENT-IMP: ABNORMAL
BLOOD BANK CMNT PATIENT-IMP: NORMAL
BUN SERPL-MCNC: 7 MG/DL (ref 7–30)
CALCIUM SERPL-MCNC: 8.3 MG/DL (ref 8.5–10.1)
CHLORIDE SERPL-SCNC: 106 MMOL/L (ref 94–109)
CO2 SERPL-SCNC: 24 MMOL/L (ref 20–32)
CREAT SERPL-MCNC: 0.57 MG/DL (ref 0.52–1.04)
CREAT SERPL-MCNC: 0.62 MG/DL (ref 0.52–1.04)
CREAT UR-MCNC: 86 MG/DL
DIFFERENTIAL METHOD BLD: ABNORMAL
EOSINOPHIL # BLD AUTO: 0 10E9/L (ref 0–0.7)
EOSINOPHIL NFR BLD AUTO: 0 %
ERYTHROCYTE [DISTWIDTH] IN BLOOD BY AUTOMATED COUNT: 13.2 % (ref 10–15)
ERYTHROCYTE [DISTWIDTH] IN BLOOD BY AUTOMATED COUNT: 13.4 % (ref 10–15)
GFR SERPL CREATININE-BSD FRML MDRD: >90 ML/MIN/{1.73_M2}
GFR SERPL CREATININE-BSD FRML MDRD: >90 ML/MIN/{1.73_M2}
GLUCOSE SERPL-MCNC: 81 MG/DL (ref 70–99)
HCT VFR BLD AUTO: 34.4 % (ref 35–47)
HCT VFR BLD AUTO: 34.6 % (ref 35–47)
HGB BLD-MCNC: 11.8 G/DL (ref 11.7–15.7)
HGB BLD-MCNC: 12 G/DL (ref 11.7–15.7)
IMM GRANULOCYTES # BLD: 0 10E9/L (ref 0–0.4)
IMM GRANULOCYTES NFR BLD: 0.6 %
LYMPHOCYTES # BLD AUTO: 1.7 10E9/L (ref 0.8–5.3)
LYMPHOCYTES NFR BLD AUTO: 25.5 %
MCH RBC QN AUTO: 31.5 PG (ref 26.5–33)
MCH RBC QN AUTO: 32.4 PG (ref 26.5–33)
MCHC RBC AUTO-ENTMCNC: 34.3 G/DL (ref 31.5–36.5)
MCHC RBC AUTO-ENTMCNC: 34.7 G/DL (ref 31.5–36.5)
MCV RBC AUTO: 92 FL (ref 78–100)
MCV RBC AUTO: 94 FL (ref 78–100)
MONOCYTES # BLD AUTO: 0.5 10E9/L (ref 0–1.3)
MONOCYTES NFR BLD AUTO: 7.2 %
NEUTROPHILS # BLD AUTO: 4.5 10E9/L (ref 1.6–8.3)
NEUTROPHILS NFR BLD AUTO: 66.6 %
NRBC # BLD AUTO: 0 10*3/UL
NRBC BLD AUTO-RTO: 0 /100
PLATELET # BLD AUTO: 165 10E9/L (ref 150–450)
PLATELET # BLD AUTO: 176 10E9/L (ref 150–450)
PLATELET # BLD AUTO: 196 10E9/L (ref 150–450)
POTASSIUM SERPL-SCNC: 3.5 MMOL/L (ref 3.4–5.3)
PROT SERPL-MCNC: 6.2 G/DL (ref 6.8–8.8)
PROT UR-MCNC: 0.16 G/L
PROT/CREAT 24H UR: 0.19 G/G CR (ref 0–0.2)
RBC # BLD AUTO: 3.7 10E12/L (ref 3.8–5.2)
RBC # BLD AUTO: 3.75 10E12/L (ref 3.8–5.2)
SODIUM SERPL-SCNC: 138 MMOL/L (ref 133–144)
SPECIMEN EXP DATE BLD: ABNORMAL
T PALLIDUM AB SER QL: NONREACTIVE
WBC # BLD AUTO: 6.8 10E9/L (ref 4–11)
WBC # BLD AUTO: 7.2 10E9/L (ref 4–11)

## 2021-07-07 PROCEDURE — 84156 ASSAY OF PROTEIN URINE: CPT | Performed by: OBSTETRICS & GYNECOLOGY

## 2021-07-07 PROCEDURE — 250N000009 HC RX 250: Performed by: ANESTHESIOLOGY

## 2021-07-07 PROCEDURE — 85049 AUTOMATED PLATELET COUNT: CPT | Performed by: OBSTETRICS & GYNECOLOGY

## 2021-07-07 PROCEDURE — 722N000001 HC LABOR CARE VAGINAL DELIVERY SINGLE

## 2021-07-07 PROCEDURE — 86780 TREPONEMA PALLIDUM: CPT | Performed by: OBSTETRICS & GYNECOLOGY

## 2021-07-07 PROCEDURE — 36415 COLL VENOUS BLD VENIPUNCTURE: CPT | Performed by: OBSTETRICS & GYNECOLOGY

## 2021-07-07 PROCEDURE — 370N000003 HC ANESTHESIA WARD SERVICE

## 2021-07-07 PROCEDURE — 250N000011 HC RX IP 250 OP 636: Performed by: ANESTHESIOLOGY

## 2021-07-07 PROCEDURE — 85025 COMPLETE CBC W/AUTO DIFF WBC: CPT | Performed by: OBSTETRICS & GYNECOLOGY

## 2021-07-07 PROCEDURE — 0KQM0ZZ REPAIR PERINEUM MUSCLE, OPEN APPROACH: ICD-10-PCS | Performed by: OBSTETRICS & GYNECOLOGY

## 2021-07-07 PROCEDURE — 86900 BLOOD TYPING SEROLOGIC ABO: CPT | Performed by: OBSTETRICS & GYNECOLOGY

## 2021-07-07 PROCEDURE — 250N000013 HC RX MED GY IP 250 OP 250 PS 637: Performed by: OBSTETRICS & GYNECOLOGY

## 2021-07-07 PROCEDURE — 3E0R3BZ INTRODUCTION OF ANESTHETIC AGENT INTO SPINAL CANAL, PERCUTANEOUS APPROACH: ICD-10-PCS | Performed by: ANESTHESIOLOGY

## 2021-07-07 PROCEDURE — 250N000009 HC RX 250: Performed by: OBSTETRICS & GYNECOLOGY

## 2021-07-07 PROCEDURE — 85027 COMPLETE CBC AUTOMATED: CPT | Performed by: OBSTETRICS & GYNECOLOGY

## 2021-07-07 PROCEDURE — 120N000001 HC R&B MED SURG/OB

## 2021-07-07 PROCEDURE — 86901 BLOOD TYPING SEROLOGIC RH(D): CPT | Performed by: OBSTETRICS & GYNECOLOGY

## 2021-07-07 PROCEDURE — 10907ZC DRAINAGE OF AMNIOTIC FLUID, THERAPEUTIC FROM PRODUCTS OF CONCEPTION, VIA NATURAL OR ARTIFICIAL OPENING: ICD-10-PCS | Performed by: OBSTETRICS & GYNECOLOGY

## 2021-07-07 PROCEDURE — 3E033VJ INTRODUCTION OF OTHER HORMONE INTO PERIPHERAL VEIN, PERCUTANEOUS APPROACH: ICD-10-PCS | Performed by: OBSTETRICS & GYNECOLOGY

## 2021-07-07 PROCEDURE — 86850 RBC ANTIBODY SCREEN: CPT | Performed by: OBSTETRICS & GYNECOLOGY

## 2021-07-07 PROCEDURE — 80053 COMPREHEN METABOLIC PANEL: CPT | Performed by: OBSTETRICS & GYNECOLOGY

## 2021-07-07 PROCEDURE — 00HU33Z INSERTION OF INFUSION DEVICE INTO SPINAL CANAL, PERCUTANEOUS APPROACH: ICD-10-PCS | Performed by: ANESTHESIOLOGY

## 2021-07-07 PROCEDURE — 258N000003 HC RX IP 258 OP 636: Performed by: OBSTETRICS & GYNECOLOGY

## 2021-07-07 PROCEDURE — 82565 ASSAY OF CREATININE: CPT | Performed by: OBSTETRICS & GYNECOLOGY

## 2021-07-07 PROCEDURE — 999N000157 HC STATISTIC RCP TIME EA 10 MIN

## 2021-07-07 PROCEDURE — 86870 RBC ANTIBODY IDENTIFICATION: CPT | Performed by: OBSTETRICS & GYNECOLOGY

## 2021-07-07 PROCEDURE — 999N000016 HC STATISTIC ATTENDANCE AT DELIVERY

## 2021-07-07 PROCEDURE — 250N000011 HC RX IP 250 OP 636: Performed by: OBSTETRICS & GYNECOLOGY

## 2021-07-07 RX ORDER — OXYCODONE AND ACETAMINOPHEN 5; 325 MG/1; MG/1
1 TABLET ORAL
Status: COMPLETED | OUTPATIENT
Start: 2021-07-07 | End: 2021-07-08

## 2021-07-07 RX ORDER — OXYTOCIN 10 [USP'U]/ML
10 INJECTION, SOLUTION INTRAMUSCULAR; INTRAVENOUS
Status: DISCONTINUED | OUTPATIENT
Start: 2021-07-07 | End: 2021-07-07

## 2021-07-07 RX ORDER — METHYLERGONOVINE MALEATE 0.2 MG/ML
200 INJECTION INTRAVENOUS
Status: DISCONTINUED | OUTPATIENT
Start: 2021-07-07 | End: 2021-07-07

## 2021-07-07 RX ORDER — IBUPROFEN 800 MG/1
800 TABLET, FILM COATED ORAL EVERY 6 HOURS PRN
Status: DISCONTINUED | OUTPATIENT
Start: 2021-07-07 | End: 2021-07-09 | Stop reason: HOSPADM

## 2021-07-07 RX ORDER — TRANEXAMIC ACID 10 MG/ML
1 INJECTION, SOLUTION INTRAVENOUS EVERY 30 MIN PRN
Status: DISCONTINUED | OUTPATIENT
Start: 2021-07-07 | End: 2021-07-07

## 2021-07-07 RX ORDER — AMOXICILLIN 250 MG
2 CAPSULE ORAL 2 TIMES DAILY
Status: DISCONTINUED | OUTPATIENT
Start: 2021-07-07 | End: 2021-07-09 | Stop reason: HOSPADM

## 2021-07-07 RX ORDER — OXYTOCIN/0.9 % SODIUM CHLORIDE 30/500 ML
340 PLASTIC BAG, INJECTION (ML) INTRAVENOUS CONTINUOUS PRN
Status: DISCONTINUED | OUTPATIENT
Start: 2021-07-07 | End: 2021-07-09 | Stop reason: HOSPADM

## 2021-07-07 RX ORDER — AMOXICILLIN 250 MG
1 CAPSULE ORAL 2 TIMES DAILY
Status: DISCONTINUED | OUTPATIENT
Start: 2021-07-07 | End: 2021-07-09 | Stop reason: HOSPADM

## 2021-07-07 RX ORDER — MODIFIED LANOLIN
OINTMENT (GRAM) TOPICAL
Status: DISCONTINUED | OUTPATIENT
Start: 2021-07-07 | End: 2021-07-09 | Stop reason: HOSPADM

## 2021-07-07 RX ORDER — HYDROCORTISONE 2.5 %
CREAM (GRAM) TOPICAL 3 TIMES DAILY PRN
Status: DISCONTINUED | OUTPATIENT
Start: 2021-07-07 | End: 2021-07-09 | Stop reason: HOSPADM

## 2021-07-07 RX ORDER — SODIUM CHLORIDE, SODIUM LACTATE, POTASSIUM CHLORIDE, CALCIUM CHLORIDE 600; 310; 30; 20 MG/100ML; MG/100ML; MG/100ML; MG/100ML
INJECTION, SOLUTION INTRAVENOUS CONTINUOUS
Status: DISCONTINUED | OUTPATIENT
Start: 2021-07-07 | End: 2021-07-08

## 2021-07-07 RX ORDER — FLUOXETINE 10 MG/1
10 CAPSULE ORAL DAILY
Status: DISCONTINUED | OUTPATIENT
Start: 2021-07-07 | End: 2021-07-09 | Stop reason: HOSPADM

## 2021-07-07 RX ORDER — BISACODYL 10 MG
10 SUPPOSITORY, RECTAL RECTAL DAILY PRN
Status: DISCONTINUED | OUTPATIENT
Start: 2021-07-09 | End: 2021-07-09 | Stop reason: HOSPADM

## 2021-07-07 RX ORDER — IBUPROFEN 800 MG/1
800 TABLET, FILM COATED ORAL
Status: DISCONTINUED | OUTPATIENT
Start: 2021-07-07 | End: 2021-07-08

## 2021-07-07 RX ORDER — OXYTOCIN/0.9 % SODIUM CHLORIDE 30/500 ML
100-340 PLASTIC BAG, INJECTION (ML) INTRAVENOUS CONTINUOUS PRN
Status: DISCONTINUED | OUTPATIENT
Start: 2021-07-07 | End: 2021-07-08

## 2021-07-07 RX ORDER — NALOXONE HYDROCHLORIDE 0.4 MG/ML
0.4 INJECTION, SOLUTION INTRAMUSCULAR; INTRAVENOUS; SUBCUTANEOUS
Status: DISCONTINUED | OUTPATIENT
Start: 2021-07-07 | End: 2021-07-07

## 2021-07-07 RX ORDER — NALOXONE HYDROCHLORIDE 0.4 MG/ML
0.4 INJECTION, SOLUTION INTRAMUSCULAR; INTRAVENOUS; SUBCUTANEOUS
Status: DISCONTINUED | OUTPATIENT
Start: 2021-07-07 | End: 2021-07-08

## 2021-07-07 RX ORDER — SODIUM CHLORIDE, SODIUM LACTATE, POTASSIUM CHLORIDE, CALCIUM CHLORIDE 600; 310; 30; 20 MG/100ML; MG/100ML; MG/100ML; MG/100ML
INJECTION, SOLUTION INTRAVENOUS CONTINUOUS
Status: DISCONTINUED | OUTPATIENT
Start: 2021-07-07 | End: 2021-07-07

## 2021-07-07 RX ORDER — NALOXONE HYDROCHLORIDE 0.4 MG/ML
0.2 INJECTION, SOLUTION INTRAMUSCULAR; INTRAVENOUS; SUBCUTANEOUS
Status: DISCONTINUED | OUTPATIENT
Start: 2021-07-07 | End: 2021-07-07

## 2021-07-07 RX ORDER — EPHEDRINE SULFATE 50 MG/ML
5 INJECTION, SOLUTION INTRAMUSCULAR; INTRAVENOUS; SUBCUTANEOUS
Status: DISCONTINUED | OUTPATIENT
Start: 2021-07-07 | End: 2021-07-08

## 2021-07-07 RX ORDER — OXYTOCIN/0.9 % SODIUM CHLORIDE 30/500 ML
100 PLASTIC BAG, INJECTION (ML) INTRAVENOUS CONTINUOUS
Status: DISCONTINUED | OUTPATIENT
Start: 2021-07-07 | End: 2021-07-09 | Stop reason: HOSPADM

## 2021-07-07 RX ORDER — NALOXONE HYDROCHLORIDE 0.4 MG/ML
0.2 INJECTION, SOLUTION INTRAMUSCULAR; INTRAVENOUS; SUBCUTANEOUS
Status: DISCONTINUED | OUTPATIENT
Start: 2021-07-07 | End: 2021-07-08

## 2021-07-07 RX ORDER — OXYTOCIN 10 [USP'U]/ML
10 INJECTION, SOLUTION INTRAMUSCULAR; INTRAVENOUS
Status: DISCONTINUED | OUTPATIENT
Start: 2021-07-07 | End: 2021-07-09 | Stop reason: HOSPADM

## 2021-07-07 RX ORDER — NALBUPHINE HYDROCHLORIDE 10 MG/ML
2.5-5 INJECTION, SOLUTION INTRAMUSCULAR; INTRAVENOUS; SUBCUTANEOUS EVERY 6 HOURS PRN
Status: DISCONTINUED | OUTPATIENT
Start: 2021-07-07 | End: 2021-07-08

## 2021-07-07 RX ORDER — IBUPROFEN 800 MG/1
800 TABLET, FILM COATED ORAL
Status: DISCONTINUED | OUTPATIENT
Start: 2021-07-07 | End: 2021-07-07

## 2021-07-07 RX ORDER — LABETALOL 200 MG/1
200 TABLET, FILM COATED ORAL 2 TIMES DAILY
Status: DISCONTINUED | OUTPATIENT
Start: 2021-07-07 | End: 2021-07-09 | Stop reason: HOSPADM

## 2021-07-07 RX ORDER — METHYLERGONOVINE MALEATE 0.2 MG/ML
200 INJECTION INTRAVENOUS
Status: DISCONTINUED | OUTPATIENT
Start: 2021-07-07 | End: 2021-07-08

## 2021-07-07 RX ORDER — LIDOCAINE HYDROCHLORIDE AND EPINEPHRINE 15; 5 MG/ML; UG/ML
INJECTION, SOLUTION EPIDURAL PRN
Status: DISCONTINUED | OUTPATIENT
Start: 2021-07-07 | End: 2021-07-07

## 2021-07-07 RX ORDER — BUPIVACAINE HYDROCHLORIDE 2.5 MG/ML
INJECTION, SOLUTION EPIDURAL; INFILTRATION; INTRACAUDAL PRN
Status: DISCONTINUED | OUTPATIENT
Start: 2021-07-07 | End: 2021-07-07

## 2021-07-07 RX ORDER — TRANEXAMIC ACID 10 MG/ML
1 INJECTION, SOLUTION INTRAVENOUS EVERY 30 MIN PRN
Status: DISCONTINUED | OUTPATIENT
Start: 2021-07-07 | End: 2021-07-09 | Stop reason: HOSPADM

## 2021-07-07 RX ORDER — ONDANSETRON 2 MG/ML
4 INJECTION INTRAMUSCULAR; INTRAVENOUS EVERY 6 HOURS PRN
Status: DISCONTINUED | OUTPATIENT
Start: 2021-07-07 | End: 2021-07-07

## 2021-07-07 RX ORDER — OXYTOCIN/0.9 % SODIUM CHLORIDE 30/500 ML
1-24 PLASTIC BAG, INJECTION (ML) INTRAVENOUS CONTINUOUS
Status: DISCONTINUED | OUTPATIENT
Start: 2021-07-07 | End: 2021-07-08

## 2021-07-07 RX ORDER — OXYTOCIN/0.9 % SODIUM CHLORIDE 30/500 ML
100-340 PLASTIC BAG, INJECTION (ML) INTRAVENOUS CONTINUOUS PRN
Status: DISCONTINUED | OUTPATIENT
Start: 2021-07-07 | End: 2021-07-07

## 2021-07-07 RX ORDER — OXYCODONE AND ACETAMINOPHEN 5; 325 MG/1; MG/1
1 TABLET ORAL
Status: DISCONTINUED | OUTPATIENT
Start: 2021-07-07 | End: 2021-07-07

## 2021-07-07 RX ORDER — ACETAMINOPHEN 325 MG/1
650 TABLET ORAL EVERY 4 HOURS PRN
Status: DISCONTINUED | OUTPATIENT
Start: 2021-07-07 | End: 2021-07-09 | Stop reason: HOSPADM

## 2021-07-07 RX ORDER — FENTANYL CITRATE 50 UG/ML
50-100 INJECTION, SOLUTION INTRAMUSCULAR; INTRAVENOUS
Status: DISCONTINUED | OUTPATIENT
Start: 2021-07-07 | End: 2021-07-08

## 2021-07-07 RX ORDER — ACETAMINOPHEN 325 MG/1
650 TABLET ORAL EVERY 4 HOURS PRN
Status: DISCONTINUED | OUTPATIENT
Start: 2021-07-07 | End: 2021-07-07

## 2021-07-07 RX ORDER — LIDOCAINE 40 MG/G
CREAM TOPICAL
Status: DISCONTINUED | OUTPATIENT
Start: 2021-07-07 | End: 2021-07-08

## 2021-07-07 RX ORDER — CARBOPROST TROMETHAMINE 250 UG/ML
250 INJECTION, SOLUTION INTRAMUSCULAR
Status: DISCONTINUED | OUTPATIENT
Start: 2021-07-07 | End: 2021-07-08

## 2021-07-07 RX ORDER — ONDANSETRON 2 MG/ML
4 INJECTION INTRAMUSCULAR; INTRAVENOUS EVERY 6 HOURS PRN
Status: DISCONTINUED | OUTPATIENT
Start: 2021-07-07 | End: 2021-07-08

## 2021-07-07 RX ORDER — CARBOPROST TROMETHAMINE 250 UG/ML
250 INJECTION, SOLUTION INTRAMUSCULAR
Status: DISCONTINUED | OUTPATIENT
Start: 2021-07-07 | End: 2021-07-07

## 2021-07-07 RX ADMIN — LIDOCAINE HYDROCHLORIDE,EPINEPHRINE BITARTRATE 2 ML: 15; .005 INJECTION, SOLUTION EPIDURAL; INFILTRATION; INTRACAUDAL; PERINEURAL at 13:44

## 2021-07-07 RX ADMIN — FENTANYL CITRATE 50 MCG: 50 INJECTION, SOLUTION INTRAMUSCULAR; INTRAVENOUS at 10:41

## 2021-07-07 RX ADMIN — ACETAMINOPHEN 650 MG: 325 TABLET, FILM COATED ORAL at 23:15

## 2021-07-07 RX ADMIN — BUPIVACAINE HYDROCHLORIDE 10 ML: 2.5 INJECTION, SOLUTION EPIDURAL; INFILTRATION; INTRACAUDAL at 13:47

## 2021-07-07 RX ADMIN — ACETAMINOPHEN 650 MG: 325 TABLET, FILM COATED ORAL at 16:45

## 2021-07-07 RX ADMIN — FENTANYL CITRATE 100 MCG: 50 INJECTION, SOLUTION INTRAMUSCULAR; INTRAVENOUS at 11:37

## 2021-07-07 RX ADMIN — FENTANYL CITRATE 50 MCG: 50 INJECTION, SOLUTION INTRAMUSCULAR; INTRAVENOUS at 10:35

## 2021-07-07 RX ADMIN — Medication 2 MILLI-UNITS/MIN: at 09:12

## 2021-07-07 RX ADMIN — SODIUM CHLORIDE, POTASSIUM CHLORIDE, SODIUM LACTATE AND CALCIUM CHLORIDE: 600; 310; 30; 20 INJECTION, SOLUTION INTRAVENOUS at 08:55

## 2021-07-07 RX ADMIN — LABETALOL HYDROCHLORIDE 200 MG: 200 TABLET, FILM COATED ORAL at 23:15

## 2021-07-07 RX ADMIN — Medication: at 13:47

## 2021-07-07 RX ADMIN — SODIUM CHLORIDE, POTASSIUM CHLORIDE, SODIUM LACTATE AND CALCIUM CHLORIDE: 600; 310; 30; 20 INJECTION, SOLUTION INTRAVENOUS at 13:21

## 2021-07-07 RX ADMIN — FENTANYL CITRATE 100 MCG: 50 INJECTION, SOLUTION INTRAMUSCULAR; INTRAVENOUS at 12:31

## 2021-07-07 RX ADMIN — LIDOCAINE HYDROCHLORIDE,EPINEPHRINE BITARTRATE 3 ML: 15; .005 INJECTION, SOLUTION EPIDURAL; INFILTRATION; INTRACAUDAL; PERINEURAL at 13:40

## 2021-07-07 RX ADMIN — ONDANSETRON 4 MG: 2 INJECTION INTRAMUSCULAR; INTRAVENOUS at 20:58

## 2021-07-07 ASSESSMENT — LIFESTYLE VARIABLES: TOBACCO_USE: 1

## 2021-07-07 ASSESSMENT — MIFFLIN-ST. JEOR: SCORE: 1604.47

## 2021-07-07 NOTE — PROVIDER NOTIFICATION
07/07/21 1331   Provider Notification   Provider Name/Title Dr. Coronado   Method of Notification At Bedside   Request Evaluate in Person   Notification Reason Pain   Dr. Coronado at bedside for epidural placement.

## 2021-07-07 NOTE — PROVIDER NOTIFICATION
07/07/21 0858   Provider Notification   Provider Name/Title Dr. Green   Method of Notification At Bedside   Request Evaluate in Person   Dr. Green at bedside to evaluate patient and discuss plan of care. Bedside ultrasound confirms vertex presentation. SVE 2/70/-3 in clinic. Plan to begin induction pitocin at this time and check SVE at 1100 and update MD. Intrapartum and induction orders received.

## 2021-07-07 NOTE — PROVIDER NOTIFICATION
07/07/21 3047   Provider Notification   Provider Name/Title Dr. Hook   Method of Notification Phone   Request Evaluate - Remote   Notification Reason Decels;Uterine Activity;Status Update;SVE   Dr. Hook paged and updated on SVE 7/80/-2, FHR tracing category II with moderate variability and recurrent variable decelerations down to 60-90 bpm, contractions every 2-2.5 minutes and patient feeling pressure with contractions. MD at [facility]. Will perform SVE and udpate Dr. Hook with exam.

## 2021-07-07 NOTE — PLAN OF CARE
30 year old  at 38w0d gestation presents to L&D for scheduled induction of labor due to CHTN and Sjogren's disease. Patient reports good fetal movement and occasional contractions, denies leaking of fluid and vaginal bleeding. EFM and toco explained and applied. Health history reviewed and physical assessment completed. Patient reports Covid + on 21. Results confirmed via patient phone records from  Labs in [place], [place].  Patient with recent Covid + test on 21, but still within 90 day window, so will be considered Covid recovered.  Will update Dr. Green for further orders.

## 2021-07-07 NOTE — PROVIDER NOTIFICATION
07/07/21 1642   Provider Notification   Provider Name/Title Dr. Green    Method of Notification In Department   Request Evaluate - Remote   Notification Reason Uterine Activity;Status Update;SVE   Dr. Green at nurse station and updated on SVE 5/80/-2, FHR tracing and contractions pattern reviewed by MD, pitocin at 12 milliunits, patient resting comfortably with epidural. No new orders. Dr. Hook assuming cares at 1700. Will update MD as needed.

## 2021-07-07 NOTE — ANESTHESIA PROCEDURE NOTES
Epidural catheter Procedure Note  Pre-Procedure   Staff -        Anesthesiologist:  Luc Coronado MD       Performed By: anesthesiologist       Referred By: Peter       Location: OB       Pre-Anesthestic Checklist: patient identified, IV checked, risks and benefits discussed, informed consent, monitors and equipment checked, pre-op evaluation and at physician/surgeon's request  Timeout:       Correct Patient: Yes        Correct Procedure: Yes        Correct Site: Yes        Correct Position: Yes   Procedure Documentation  Procedure: epidural catheter       Diagnosis: labor       Patient Position: sitting       Patient Prep/Sterile Barriers: sterile gloves, patient draped       Skin prep: Betadine       Local skin infiltrated with 3 mL of 1% lidocaine.        Insertion Site: L3-4. (midline approach).       Technique: LORT saline        JAMEL at 6 cm.       Needle Type: [employer] needle       Needle Gauge: 17.        Needle Length (Inches): 3.5        Catheter: 19 G.         Catheter threaded easily.         Threaded 11 cm at skin.         # of attempts: 1 and  # of redirects:  1    Assessment/Narrative         Paresthesias: No.      Test dose of 3 mL lidocaine 1.5% w/ 1:200,000 epinephrine at.         Test dose negative, 3 minutes after injection, for signs of intravascular, subdural, or intrathecal injection.       Insertion/Infusion Method: LORT saline       Aspiration negative for Heme or CSF via Epidural Catheter.    Comments:    Procedure tolerated well without apparent complications. Initial bolus of 0.25% bupivacaine given. Epidural infusion (0.125% bupi with fentanyl 2mcg/ml) started at 10 ml/hr with PCEA of 5 ml q15min with a max cumulative dose of 20 ml/hr. PCEA instructions given and use encouraged PRN. Epidural expectations reviewed and questions answered. Patient hemodynamically stable.  Patient and epidural functionality to be reassessed later via vital sign flowsheet, nursing communication, and/or  patient report.  Anesthesiologist immediately available for management.

## 2021-07-07 NOTE — PROGRESS NOTES
"OB Progress Note  2021  12:51 PM    S:  Pt getting more uncomfortable, requests epidural.    O:  /88   Pulse 83   Temp 98.7  F (37.1  C) (Oral)   Resp 18   Ht 1.549 m (5' 1\")   Wt 95.7 kg (211 lb)   LMP 10/01/2020   BMI 39.87 kg/m    EFM: baseline 135, accelerations absent, no decelerations, moderate variability  Diagonal:  Ctx q2-3min  SVE:  3-4/80/-2  Membranes: AROM @1250, clear fluid    Pitocin at 7 mU/min    A/P: 30 year old  @ 38w0d admitted for scheduled IOL due to CHTN.     Labor:  - s/p AROM, clear fluid  - Continue IV pitocin, titrate per protocol  - Pain: per patient preference      CHTN: Continue labetalol 200 mg BID  - BPs stable  - PreE labs within normal limits, repeat PRN     FWB: Category I  - continuous EFM. FSE placed for easier fetal monitoring due to habitus  - EFW 7.5 lbs     Maternal Sjogrens: had fetal cardiac monitoring during pregnancy  - MFM recommends fetal EKG postpartum  - Notify peds     Prenatal:  - Rh neg, will need rhogam eval postpartum  - COVID positive screen. However s/p COVID infection 2021, recovered.  Does NOT need COVID isolation precautions  - Anxiety/depression: continue fluoxetine  - Asthma: On albuterol and pulmicort, caution with hemabate use  - LSIL: needs repeat colpo postpartum    Elysia Green MD      "

## 2021-07-07 NOTE — PROVIDER NOTIFICATION
07/07/21 1122   Provider Notification   Provider Name/Title Dr. Green    Method of Notification Phone   Request Evaluate - Remote   Notification Reason Labor Status;Uterine Activity;Pain;Status Update;SVE   Dr. Green paged and updated on SVE 2.5/80/-3, head ballotable, FHR tracing category I, accelerations not present at this time, contractions every 3-3.5 minutes, pitocin at 6 milliunits, patient has had 1 dose of fentanyl and contemplating epidural. Orders to increase pitocin as able and plan to check SVE at 1300 and update MD.  Will contact sooner if needed.

## 2021-07-07 NOTE — PROVIDER NOTIFICATION
07/07/21 1240   Provider Notification   Provider Name/Title Dr. Green   Method of Notification At Bedside   Request Evaluate in Person   Dr. Green at bedside to evaluate patient and discuss plan of care. SVE per MD 3.5/80/-2, AROM with clear fluid noted. Audible FHR deceleration heard, FSE placed by MD without difficulty.  No new orders, continue with plan of care.

## 2021-07-07 NOTE — ANESTHESIA PREPROCEDURE EVALUATION
Anesthesia Pre-Procedure Evaluation    Patient: Yamilet Vazquez   MRN: [id number] : 1990        Preoperative Diagnosis: * No surgery found *   Procedure :      Past Medical History:   Diagnosis Date     Acute otitis media 2013     ADHD      Anxiety      Asthma      Chronic hypertension      Depressive disorder     takes fluoxetine     History of seizures as a child      Obesity      Psoriasis      Sjogren's syndrome (H)      SS-A antibody positive       Past Surgical History:   Procedure Laterality Date     AS INDUCED ABORTN BY DIL/EVAC       TONSILLECTOMY, ADENOIDECTOMY, MYRINGOTOMY, INSERT TUBE BILATERAL, COMBINED        No Known Allergies   Social History     Tobacco Use     Smoking status: Former Smoker     Packs/day: 0.25     Types: Cigarettes     Quit date: 2018     Years since quittin.5     Smokeless tobacco: Never Used   Substance Use Topics     Alcohol use: Not Currently      Wt Readings from Last 1 Encounters:   21 95.7 kg (211 lb)        Anesthesia Evaluation   Pt has had prior anesthetic. Type: Regional.        ROS/MED HX  ENT/Pulmonary:     (+) tobacco use, Past use, Intermittent, asthma Treatment: Inhaler prn,      Neurologic:  - neg neurologic ROS   (+) no peripheral neuropathy     Cardiovascular:     (+) hypertension ( Chronic)----- (-) taking anticoagulants/antiplatelets   METS/Exercise Tolerance:     Hematologic:       Musculoskeletal:   (+) arthritis,     GI/Hepatic:  - neg GI/hepatic ROS     Renal/Genitourinary:  - neg Renal ROS     Endo:     (+) Obesity ( 38),     Psychiatric/Substance Use:     (+) psychiatric history anxiety, other (comment) and depression     Infectious Disease:       Malignancy:       Other:            Physical Exam    Airway        Mallampati: II   TM distance: > 3 FB   Neck ROM: full   Mouth opening: > 3 cm    Respiratory Devices and Support         Dental  no notable dental history         Cardiovascular   cardiovascular exam  normal          Pulmonary   pulmonary exam normal                OUTSIDE LABS:  CBC:   Lab Results   Component Value Date    WBC 6.8 07/07/2021    WBC 7.2 07/07/2021    HGB 11.8 07/07/2021    HGB 12.0 07/07/2021    HCT 34.4 (L) 07/07/2021    HCT 34.6 (L) 07/07/2021     07/07/2021     07/07/2021     BMP:   Lab Results   Component Value Date     07/07/2021     02/23/2021    POTASSIUM 3.5 07/07/2021    POTASSIUM 4.0 02/23/2021    CHLORIDE 106 07/07/2021    CHLORIDE 105 02/23/2021    CO2 24 07/07/2021    CO2 24 02/23/2021    BUN 7 07/07/2021    BUN 6 (L) 02/23/2021    CR 0.57 07/07/2021    CR 0.53 06/22/2021    GLC 81 07/07/2021    GLC 79 02/23/2021     COAGS: No results found for: PTT, INR, FIBR  POC:   Lab Results   Component Value Date    HCG Negative 07/11/2014     HEPATIC:   Lab Results   Component Value Date    ALBUMIN 2.6 (L) 07/07/2021    PROTTOTAL 6.2 (L) 07/07/2021    ALT 19 07/07/2021    AST 21 07/07/2021    ALKPHOS 65 07/07/2021    BILITOTAL 0.4 07/07/2021     OTHER:   Lab Results   Component Value Date    GAVIN 8.3 (L) 07/07/2021    TSH 1.37 10/05/2005       Anesthesia Plan    ASA Status:  3     - Procedure: Procedure only, no anesthetic delivered      Anesthesia Type: Epidural.              Consents    Anesthesia Plan(s) and associated risks, benefits, and realistic alternatives discussed. Questions answered and patient/representative(s) expressed understanding.     - Discussed with:  Patient         Postoperative Care            Comments:      Continuous Labor Epidural: Indication is for labor pain. Following an discussion of the procedure, expectations, and risks and benefits (risks including, but not limited to, nerve damage, infection, bleeding/hematoma, spinal headache, partial or failed block), the patient appears to understand and consents to proceed. Questions were encouraged and answered.               Luc Coronado MD

## 2021-07-07 NOTE — H&P
"OB Brief Admit H&P    No significant change in general health status based on examination of the patient, review of Nursing Admission Database and prenatal record.    Pt is a 30 year old  @ 38w0d who presented to L&D with for scheduled IOL due to CHTN.    Patient's prenatal course has been complicated by CHTN on labetalol 200 mg BID, obesity, Sjogrens, mixed anxiety and depression on fluoxetine, asthma on pulmicort and inhaler, Rh negative s/p rhogam, LSIL pap s/p colpo in pregnancy suggestive of CIN1. She was positive and symptomatic for COVID 21, recovered.  She did receive Tdap and influenza vaccines in pregnancy    Prenatal Labs:    Blood type O neg  Rubella immune  > passed 3hr GTT  GBS neg    EFW: 7.5 lbs    /74   Pulse 85   Temp 98.6  F (37  C) (Oral)   Resp 18   Ht 1.549 m (5' 1\")   Wt 95.7 kg (211 lb)   LMP 10/01/2020   BMI 39.87 kg/m    EFM:  Baseline 145 bpm, moderate variability, accels present, no decels  Pink: irregular  SVE: 2/60/-3 in clinic yesterday, ballotable - confirmed cephalic by bedside ultrasound  Membranes:  intact    Assessment:  30 year old  @ 38w0d admitted for scheduled IOL due to CHTN.    Labor:  - Will start with IV pitocin to bring head down prior to AROM.  - AROM when able  - Pain: per patient preference     CHTN: Continue labetalol 200 mg BID  - collect preE labs    FWB: Category I, reactive  - continuous EFM  - EFW 7.5 lbs    Maternal Sjogrens: had fetal cardiac monitoring during pregnancy  - [facility] recommends fetal EKG postpartum  - Notify peds    Prenatal:  - Rh neg, will need rhogam eval postpartum  - COVID positive screen. However s/p COVID infection 2021, recovered.  Does NOT need COVID isolation precautions  - Anxiety/depression: continue fluoxetine  - Asthma: On albuterol and pulmicort, caution with hemabate use  - LSIL: needs repeat colpo postpartum    Elysia Green MD  2021  8:32 AM        "

## 2021-07-07 NOTE — PROGRESS NOTES
COVID infection 4/8.  She has fully recovered and is asymptomatic. Was accidentally tested again for screening for possible C/S vs IOL and tested positive again.  Given this history, COVID isolation precautions are NOT needed.    Elysia Green MD

## 2021-07-08 VITALS
WEIGHT: 211 LBS | TEMPERATURE: 97.8 F | DIASTOLIC BLOOD PRESSURE: 73 MMHG | SYSTOLIC BLOOD PRESSURE: 114 MMHG | BODY MASS INDEX: 39.84 KG/M2 | OXYGEN SATURATION: 99 % | RESPIRATION RATE: 18 BRPM | HEART RATE: 90 BPM | HEIGHT: 61 IN

## 2021-07-08 LAB
ABO + RH BLD: NORMAL
ABO + RH BLD: NORMAL
BLOOD BANK CMNT PATIENT-IMP: NORMAL
DATE RH IMM GL GVN: NORMAL
FETAL CELL SCN BLD QL ROSETTE: NORMAL
HGB BLD-MCNC: 9.6 G/DL (ref 11.7–15.7)
RH IG VIALS RECOM PATIENT: NORMAL

## 2021-07-08 PROCEDURE — 250N000013 HC RX MED GY IP 250 OP 250 PS 637: Performed by: OBSTETRICS & GYNECOLOGY

## 2021-07-08 PROCEDURE — 250N000011 HC RX IP 250 OP 636: Performed by: OBSTETRICS & GYNECOLOGY

## 2021-07-08 PROCEDURE — 86900 BLOOD TYPING SEROLOGIC ABO: CPT | Performed by: OBSTETRICS & GYNECOLOGY

## 2021-07-08 PROCEDURE — 36415 COLL VENOUS BLD VENIPUNCTURE: CPT | Performed by: OBSTETRICS & GYNECOLOGY

## 2021-07-08 PROCEDURE — 85461 HEMOGLOBIN FETAL: CPT | Performed by: OBSTETRICS & GYNECOLOGY

## 2021-07-08 PROCEDURE — 85018 HEMOGLOBIN: CPT | Performed by: OBSTETRICS & GYNECOLOGY

## 2021-07-08 PROCEDURE — 86901 BLOOD TYPING SEROLOGIC RH(D): CPT | Performed by: OBSTETRICS & GYNECOLOGY

## 2021-07-08 RX ORDER — ACETAMINOPHEN 325 MG/1
650 TABLET ORAL EVERY 6 HOURS PRN
Status: ON HOLD | COMMUNITY
Start: 2021-07-08 | End: 2023-07-08

## 2021-07-08 RX ORDER — FLUOXETINE 10 MG/1
10 CAPSULE ORAL ONCE
Status: COMPLETED | OUTPATIENT
Start: 2021-07-08 | End: 2021-07-08

## 2021-07-08 RX ORDER — IBUPROFEN 800 MG/1
800 TABLET, FILM COATED ORAL EVERY 8 HOURS PRN
Status: ON HOLD | COMMUNITY
Start: 2021-07-08 | End: 2023-07-08

## 2021-07-08 RX ADMIN — ACETAMINOPHEN 650 MG: 325 TABLET, FILM COATED ORAL at 15:46

## 2021-07-08 RX ADMIN — IBUPROFEN 800 MG: 800 TABLET ORAL at 06:34

## 2021-07-08 RX ADMIN — FLUOXETINE 10 MG: 10 CAPSULE ORAL at 09:04

## 2021-07-08 RX ADMIN — HUMAN RHO(D) IMMUNE GLOBULIN 300 MCG: 300 INJECTION, SOLUTION INTRAMUSCULAR at 11:07

## 2021-07-08 RX ADMIN — DOCUSATE SODIUM AND SENNOSIDES 1 TABLET: 8.6; 5 TABLET, FILM COATED ORAL at 12:28

## 2021-07-08 RX ADMIN — ENOXAPARIN SODIUM 40 MG: 40 INJECTION SUBCUTANEOUS at 09:04

## 2021-07-08 RX ADMIN — IBUPROFEN 800 MG: 800 TABLET ORAL at 12:27

## 2021-07-08 RX ADMIN — LABETALOL HYDROCHLORIDE 200 MG: 200 TABLET, FILM COATED ORAL at 09:04

## 2021-07-08 RX ADMIN — FLUOXETINE 10 MG: 10 CAPSULE ORAL at 11:07

## 2021-07-08 RX ADMIN — ACETAMINOPHEN 650 MG: 325 TABLET, FILM COATED ORAL at 06:34

## 2021-07-08 RX ADMIN — OXYCODONE HYDROCHLORIDE AND ACETAMINOPHEN 1 TABLET: 5; 325 TABLET ORAL at 06:35

## 2021-07-08 NOTE — ANESTHESIA POSTPROCEDURE EVALUATION
S/P epidural for labor.   I or my partner was immediately available for management of this patient during epidural analgesia infusion.  VSS.  Doing well. Block resolved.  Neuro at baseline. Denies positional headache. Minimal side effects easily managed w/ PRN meds. No apparent anesthetic complications. No follow-up required.    Bg Brown, MDPatient: Yamilet Vazquez    * No procedures listed *    Diagnosis:* No pre-op diagnosis entered *  Diagnosis Additional Information: No value filed.    Anesthesia Type:  Epidural    Note:  Anesthesia Post Evaluation    Last vitals:  Vitals:    07/07/21 2130 07/07/21 2237 07/08/21 0243   BP: 131/76 131/76 118/66   Pulse:  80 77   Resp:  17 18   Temp:  97.8  F (36.6  C) 97.8  F (36.6  C)   SpO2:          Last vitals prior to Anesthesia Care Transfer:      Electronically Signed By: Bg Brown MD  July 8, 2021  7:58 AM

## 2021-07-08 NOTE — PLAN OF CARE
VSS, Bonding well with baby. Pain is well controlled with tylenol and ibuprofen. Patient is voiding without difficulty and ambulating independently. Tolerating regular diet well. Independent in self and baby cares. Formula feeding is going well.

## 2021-07-08 NOTE — PLAN OF CARE
Data: Yamilet Vazquez transferred to postpartum via wheelchair at 2155. Baby transferred via parent's arms.  Action: Receiving unit notified of transfer: Yes. Patient and family notified of room change. Report given to CONCHIS Gallagher at 2200. Belongings sent to receiving unit. Accompanied by Registered Nurse. Oriented patient to surroundings. Call light within reach. ID bands double-checked with receiving RN.  Response: Patient tolerated transfer and is stable.

## 2021-07-08 NOTE — PROVIDER NOTIFICATION
07/07/21 1926   Provider Notification   Provider Name/Title Dr. Hook   Method of Notification At Bedside   Request Attend Delivery   Dr. Hook at bedside

## 2021-07-08 NOTE — PROVIDER NOTIFICATION
07/07/21 1909   Provider Notification   Provider Name/Title Dr. Hook   Method of Notification Electronic Page   Request Attend Delivery   Dr. Hook paged to come for delivery.

## 2021-07-08 NOTE — PLAN OF CARE
Patient up ad christen room, denies difficulty voiding. Utilizing ibuprofen and heat/ice packs for pain control, pain is tolerable per patient. Desires discharge this evening if able based on infant screens.

## 2021-07-08 NOTE — PROGRESS NOTES
"OB Post-partum Note  PPD# 1    S:  Patient doing well.  Pain controlled.  Voiding.  Bleeding is normal.  Bottle feeding.     O:  BP (!) 145/83   Pulse 75   Temp 98.2  F (36.8  C) (Oral)   Resp 18   Ht 1.549 m (5' 1\")   Wt 95.7 kg (211 lb)   LMP 10/01/2020   SpO2 99%   Breastfeeding Unknown   BMI 39.87 kg/m    Gen- A&O, NAD  Abd- Non-tender, fundus non tender and firm   Ext- non-tender, no calf pain    Hemoglobin   Date Value Ref Range Status   2021 9.6 (L) 11.7 - 15.7 g/dL Final     O neg  Rubella Immune    A/P: 30 year old  PPD# 1 s/p   Pregnancy c/b chronic HTN, Sjogrens    1.  Routine post-partum cares  2.  CHTN: on labetalol 200mg BID. BP's mildly elevated, none severe. F/u Monday for BP check  3.  Discharge home later today at pt request, no Rx needed    Kaylee Delgado MD  2021  11:55 AM  "

## 2021-07-08 NOTE — PLAN OF CARE
VSS. Patient up ad christen. Bottle feeding formula per parental request.light to scant amount of lochia. No clots noted. Tylenol and Ibuprofen available for pain. Voiding, active bowel sounds x4. Bonding well with baby. Attentive to cares. Father at bedside and supportive. Continue plan of care.

## 2021-07-08 NOTE — PROGRESS NOTES
"I was called as in-house provider for multiparous patient complete, comfortable with epidural but with increasing pressure.  At the desk, evaluation of the FHR showed baseline of 130s with recurrent deep variable decelerations with gabriela to the 70s.  Upon entering the room, the patient had a prolonged variable deceleration to the 60s with FSE at which time SVE was completed and she was complete and 0 station.  Shortly thereafter, the patient had another decelerations at which time fallon was removed, and decision was made to proceed with delivery.  The patient pushed well with station to +2.  After the bed was broken down, the patient pushed with her next contraction and delivered a viable female infant \"Mckayla\" at 1922, HERMINIO with tight nuchal cord x1 which was delivered through.  Baby was handed off to awaiting NICU team.  Spontaneously delivery of an intact placenta with a 3-V cord ensued shortly thereafter.  She received 30 units of IV pitocin as a uterotonic agent.  Exam of the perineum revealed a 2nd laceration.  At that time, primary OB arrived - Dr. Hook.   Hand-off was given regarding the need to complete the 2nd degree perineal laceration. At the time I left the room, there were 5 labs, 3 needles and FSE. QBL was not able to be obtained.  EBL 600cc.     Eveline Seymour MD   Pager: [phone]   July 7, 2021, 7:32 PM     "

## 2021-07-09 NOTE — L&D DELIVERY NOTE
Delivery Date: 2021    ANTEPARTUM DIAGNOSES:  Intrauterine pregnancy at 39 weeks' gestation, chronic hypertension, favorable cervix    POSTPARTUM DIAGNOSES:  Status post amniotomy and Pitocin-induced normal spontaneous vaginal delivery, infant female, weight 6 pounds 10 ounces, Apgars 8 and 9, spontaneous placental passage intact, second-degree perineal and vaginal laceration, repaired. Mild post partum hemorrhage.    QUANTITATIVE BLOOD LOSS:  761 mL (postpartum hemorrhage).    PATIENT IDENTIFICATION:  Romi Vazquez is a 30-year-old  4, para 1-0-2-1 at 38 weeks' gestation who was admitted to [facility] for induction of labor due to chronic hypertension.  The patient's prenatal course was complicated by chronic hypertension, for which she was treated with labetalol 200 mg orally twice a day.  The pregnancy was also complicated by the patient's obesity, Sjogren's syndrome, mixed anxiety and depression (on fluoxetine), asthma (on Pulmicort and inhaler), Rh negative status post RhoGAM, LGSIL Pap smear status post colposcopy in pregnancy suggesting VIJAY 1.  Positive and symptomatic for COVID-19 in 2021, recovered.    The patient's blood type is O negative.  Rubella titer showed immunity.  A 1-hour glucose screen was elevated, but a 3-hour GTT was normal.  The patient's group B strep culture at term was negative.    HOSPITAL COURSE:  Romi Vazquez was admitted to [facility] by 0800 hours on 2021.  The indication for her induction of labor was chronic hypertension.  The patient was admitted, and her blood pressures were either borderline or normal.  She was afebrile.  Fetal heart tones on admission were normal at 145 beats per minute, moderate variability.  Accelerations were present.  Decelerations were absent.  The cervix was 1-2 cm dilated, 60-70% effaced, and the vertex was at a -3 station.  Intravenous oxytocin was then begun to provide adequate  labor.  By 12:00 noon, the cervix was 2-3 cm dilated.  Amniotomy was performed during a cervical exam at 1245 hours on 2021.  The fluid was clear.  At that time, the patient was 3-4 cm and 90% effaced.  The Pitocin was gradually increased to provide adequate labor.  It reached a maximum of 12 milliunits per minute.  At 1400 hours, the patient received an epidural anesthetic, and this provided excellent relief.  She was reexamined, and by 1700 hours the cervix was 5 cm.  By 1900 hours, the cervix was 7+ cm.  Complete cervical dilation was present at 1907 hours on 2021.  During the latter stages of the first stage of labor, there developed some significant variable decelerations, some with brittany bradycardia with slow return to baseline.  I was in transit to the hospital for evaluation of these decelerations and for delivery.  However, when the patient became completely dilated at 1907 hours, the fetal vertex descended to the pelvic floor.  This was associated with significant fetal bradycardia, which was persistent.  Given these factors, the in-house physician Dr. Sharma was asked to attend the patient until my arrival.  Dr. Sharma evaluated the situation and felt that delivery was necessary.  Maternal expulsive efforts were then begun, and at 1922 hours, the patient was delivered of an infant female, 6 pounds, 10 ounces with Apgars of 8 and 9 at 1 and 5 minutes respectively.  The presentation was left occiput anterior.  There was a tight nuchal cord that was reduced on the perineum.  The baby, however, was delivered without trauma and placed immediately on the mother's abdomen.  After a period of delayed cord clamping, the cord was clamped and the baby was brought to the infant warmer.  The  nurse practitioner team were in attendance for the delivery, but the baby required no significant resuscitation.    THIRD STAGE OF LABOR:  After a 3-minute third stage of labor, the placenta delivered  spontaneously intact with a 3-vessel cord.  Examination of the perineum found a second-degree perineal and vaginal laceration that was actively bleeding.  I arrived for the delivery within 3 minutes of the baby being born.  Dr. Paz relinquished the patient's care, and I took over management and repair of the second-degree laceration.  This was performed in layers without incident.  Uterine tone was excellent with administration of intravenous oxytocin.  Prior to my arrival there had been approximately 600 mL blood loss.  There was an additional 161 mL during the vaginal repair, for a total of 761 mL quantitative blood loss.  Fundal massage was performed and intravenous oxytocin administered to maintain adequate uterine tone.  The laceration repair was without incident.  The patient and baby were in excellent condition following delivery.  The first stage of labor was 6 hours and 27 minutes, second stage 4 minutes.  Third stage 3 minutes.    DELIVERY SUMMARY:  1.  Intrauterine pregnancy at 38 weeks' gestation.  2.  Induction of labor for chronic hypertension, favorable cervix.  3.  Group B strep negative.    4.  Status post Pitocin and amniotomy-augmented normal spontaneous vaginal delivery, infant female, 6 pounds 10 ounces, Apgars 8 and 9.    5.  Spontaneous placental passage intact.    6.  Second-degree vaginal and perineal laceration, repaired.    7.  Mild postpartum hemorrhage with a quantitative blood loss 761 mL.  8.  Satisfactory maternal and fetal condition after delivery.    9.  Routine postpartum cares.    Abdoulaye Hook MD        D: 2021   T: 2021   MT: [facility]    Name:     DUTCH KAPLAN  MRN:      [id number]        Account:       [id number]   :      1990           Delivery Date: 2021     Document: [id number]    cc:  Abdoulaye Hook MD

## 2021-07-09 NOTE — PLAN OF CARE
Data: Vital signs within normal limits. Postpartum checks within normal limits - see flow record. Patient eating and drinking normally. Patient able to empty bladder independently and is up ambulating. No apparent signs of infection. Patient performing self cares and is able to care for infant.  Action: Patient medicated during the shift for pain. See MAR. Patient reassessed within 1 hour after each medication and pain was improved - patient stated she was comfortable. Patient education done. See flow record.  Response: Positive attachment behaviors observed with infant. Support person, Joseph, present.   Plan: Discharged home with Significant other and infant at 2158.

## 2021-07-09 NOTE — DISCHARGE INSTRUCTIONS
Follow up in clinic for a blood pressure check on Monday 7/12/2021  Follow up in clinic in 6 weeks for normal postpartum check up    Postpartum Vaginal Delivery Instructions    Activity       Ask family and friends for help when you need it.    Do not place anything in your vagina for 6 weeks.    You are not restricted on other activities, but take it easy for a few weeks to allow your body to recover from delivery.  You are able to do any activities you feel up to that point.    No driving until you have stopped taking your pain medications (usually two weeks after delivery).     Call your health care provider if you have any of these symptoms:       Increased pain, swelling, redness, or fluid around your stiches from an episiotomy or perineal tear.    A fever above 100.4 F (38 C) with or without chills when placing a thermometer under your tongue.    You soak a sanitary pad with blood within 1 hour, or you see blood clots larger than a golf ball.    Bleeding that lasts more than 6 weeks.    Vaginal discharge that smells bad.    Severe pain, cramping or tenderness in your lower belly area.    A need to urinate more frequently (use the toilet more often), more urgently (use the toilet very quickly), or it burns when you urinate.    Nausea and vomiting.    Redness, swelling or pain around a vein in your leg.    Problems breastfeeding or a red or painful area on your breast.    Chest pain and cough or are gasping for air.    Problems coping with sadness, anxiety, or depression.  If you have any concerns about hurting yourself or the baby, call your provider immediately.     You have questions or concerns after you return home.     Keep your hands clean:  Always wash your hands before touching your perineal area and stitches.  This helps reduce your risk of infection.  If your hands aren't dirty, you may use an alcohol hand-rub to clean your hands. Keep your nails clean and short.

## 2021-07-19 NOTE — PROGRESS NOTES
Late entry:    Addendum to discharge diagnosis:  Acute blood loss anemia secondary to increased bleeding during and after vaginal delivery.     Acute Blood Loss Anemia    MARLA Hook MD

## 2022-04-21 NOTE — PROVIDER NOTIFICATION
07/07/21 1916   Provider Notification   Provider Name/Title Dr. Seymour   Method of Notification At Bedside   Request Attend Delivery   Dr. Seymour in house physician standing by at bedside for delivery given deep variable decelerations.  ~10 minutes out.   Detail Level: Zone Detail Level: Detailed

## 2022-12-05 ENCOUNTER — LAB REQUISITION (OUTPATIENT)
Dept: LAB | Facility: CLINIC | Age: 32
End: 2022-12-05

## 2022-12-05 DIAGNOSIS — Z3A.00 WEEKS OF GESTATION OF PREGNANCY NOT SPECIFIED: ICD-10-CM

## 2022-12-05 LAB — HCG INTACT+B SERPL-ACNC: 318 MIU/ML

## 2022-12-05 PROCEDURE — 84702 CHORIONIC GONADOTROPIN TEST: CPT | Performed by: OBSTETRICS & GYNECOLOGY

## 2022-12-07 ENCOUNTER — LAB REQUISITION (OUTPATIENT)
Dept: LAB | Facility: CLINIC | Age: 32
End: 2022-12-07

## 2022-12-07 DIAGNOSIS — Z32.01 ENCOUNTER FOR PREGNANCY TEST, RESULT POSITIVE: ICD-10-CM

## 2022-12-07 PROCEDURE — 84702 CHORIONIC GONADOTROPIN TEST: CPT | Performed by: OBSTETRICS & GYNECOLOGY

## 2022-12-08 LAB — HCG INTACT+B SERPL-ACNC: 991 MIU/ML

## 2023-01-16 ENCOUNTER — TRANSFERRED RECORDS (OUTPATIENT)
Dept: HEALTH INFORMATION MANAGEMENT | Facility: CLINIC | Age: 33
End: 2023-01-16

## 2023-01-16 ENCOUNTER — LAB REQUISITION (OUTPATIENT)
Dept: LAB | Facility: CLINIC | Age: 33
End: 2023-01-16

## 2023-01-16 DIAGNOSIS — Z34.81 ENCOUNTER FOR SUPERVISION OF OTHER NORMAL PREGNANCY, FIRST TRIMESTER: ICD-10-CM

## 2023-01-16 DIAGNOSIS — O16.9 UNSPECIFIED MATERNAL HYPERTENSION, UNSPECIFIED TRIMESTER: ICD-10-CM

## 2023-01-16 LAB
ALBUMIN MFR UR ELPH: 9.5 MG/DL (ref 1–14)
ALT SERPL W P-5'-P-CCNC: 8 U/L (ref 10–35)
AST SERPL W P-5'-P-CCNC: 20 U/L (ref 10–35)
BASOPHILS # BLD AUTO: 0 10E3/UL (ref 0–0.2)
BASOPHILS NFR BLD AUTO: 0 %
CREAT SERPL-MCNC: 0.4 MG/DL (ref 0.51–0.95)
CREAT UR-MCNC: 127 MG/DL
EOSINOPHIL # BLD AUTO: 0 10E3/UL (ref 0–0.7)
EOSINOPHIL NFR BLD AUTO: 0 %
ERYTHROCYTE [DISTWIDTH] IN BLOOD BY AUTOMATED COUNT: 12.2 % (ref 10–15)
GFR SERPL CREATININE-BSD FRML MDRD: >90 ML/MIN/1.73M2
HCT VFR BLD AUTO: 40.3 % (ref 35–47)
HGB BLD-MCNC: 13.8 G/DL (ref 11.7–15.7)
IMM GRANULOCYTES # BLD: 0 10E3/UL
IMM GRANULOCYTES NFR BLD: 0 %
LYMPHOCYTES # BLD AUTO: 1.5 10E3/UL (ref 0.8–5.3)
LYMPHOCYTES NFR BLD AUTO: 23 %
MCH RBC QN AUTO: 30.2 PG (ref 26.5–33)
MCHC RBC AUTO-ENTMCNC: 34.2 G/DL (ref 31.5–36.5)
MCV RBC AUTO: 88 FL (ref 78–100)
MONOCYTES # BLD AUTO: 0.4 10E3/UL (ref 0–1.3)
MONOCYTES NFR BLD AUTO: 7 %
NEUTROPHILS # BLD AUTO: 4.5 10E3/UL (ref 1.6–8.3)
NEUTROPHILS NFR BLD AUTO: 70 %
NRBC # BLD AUTO: 0 10E3/UL
NRBC BLD AUTO-RTO: 0 /100
PLATELET # BLD AUTO: 258 10E3/UL (ref 150–450)
PROT/CREAT 24H UR: 0.07 MG/MG CR (ref 0–0.2)
RBC # BLD AUTO: 4.57 10E6/UL (ref 3.8–5.2)
WBC # BLD AUTO: 6.4 10E3/UL (ref 4–11)

## 2023-01-16 PROCEDURE — 80081 OBSTETRIC PANEL INC HIV TSTG: CPT | Performed by: OBSTETRICS & GYNECOLOGY

## 2023-01-16 PROCEDURE — 84450 TRANSFERASE (AST) (SGOT): CPT | Performed by: OBSTETRICS & GYNECOLOGY

## 2023-01-16 PROCEDURE — 84460 ALANINE AMINO (ALT) (SGPT): CPT | Performed by: OBSTETRICS & GYNECOLOGY

## 2023-01-16 PROCEDURE — 82565 ASSAY OF CREATININE: CPT | Performed by: OBSTETRICS & GYNECOLOGY

## 2023-01-16 PROCEDURE — 84156 ASSAY OF PROTEIN URINE: CPT | Performed by: OBSTETRICS & GYNECOLOGY

## 2023-01-16 PROCEDURE — 87491 CHLMYD TRACH DNA AMP PROBE: CPT | Performed by: OBSTETRICS & GYNECOLOGY

## 2023-01-17 ENCOUNTER — MEDICAL CORRESPONDENCE (OUTPATIENT)
Dept: HEALTH INFORMATION MANAGEMENT | Facility: CLINIC | Age: 33
End: 2023-01-17

## 2023-01-17 LAB
C TRACH DNA SPEC QL PROBE+SIG AMP: NEGATIVE
N GONORRHOEA DNA SPEC QL NAA+PROBE: NEGATIVE

## 2023-01-18 ENCOUNTER — TRANSCRIBE ORDERS (OUTPATIENT)
Dept: MATERNAL FETAL MEDICINE | Facility: CLINIC | Age: 33
End: 2023-01-18

## 2023-01-18 DIAGNOSIS — O26.90 PREGNANCY RELATED CONDITION, ANTEPARTUM: Primary | ICD-10-CM

## 2023-01-24 DIAGNOSIS — R76.8 SS-A ANTIBODY POSITIVE: Primary | ICD-10-CM

## 2023-01-25 ENCOUNTER — TELEPHONE (OUTPATIENT)
Dept: MATERNAL FETAL MEDICINE | Facility: CLINIC | Age: 33
End: 2023-01-25

## 2023-01-25 DIAGNOSIS — R76.8 SS-A ANTIBODY POSITIVE: Primary | ICD-10-CM

## 2023-01-25 NOTE — TELEPHONE ENCOUNTER
"Called pt to discuss pts declination of scheduling appts in [place]. Pt does not wish to attend any appts in [place]. Pt states she wants to do what she did in 2nd pregnancy. Pt understands that peds cards services are based in [place] and the recommendation is for her to get fetal echos alternating with MFM ultrasounds. Reviewed with pt purpose of MFM ultrasounds, limitations of what services MFM vs cardiology can provide. Pt does not recall declining echoes in prior pregnancy. Pt states she only wants MFM ultrasounds \"as long as it can identify fetal heart block.\" Pt agreeable to plan to schedule a 16 wk ultrasound with MFM to establish monitoring plan. Orders modified.   "

## 2023-02-20 ENCOUNTER — PRE VISIT (OUTPATIENT)
Dept: MATERNAL FETAL MEDICINE | Facility: CLINIC | Age: 33
End: 2023-02-20
Payer: COMMERCIAL

## 2023-02-28 ENCOUNTER — OFFICE VISIT (OUTPATIENT)
Dept: MATERNAL FETAL MEDICINE | Facility: CLINIC | Age: 33
End: 2023-02-28
Attending: ADVANCED PRACTICE MIDWIFE
Payer: COMMERCIAL

## 2023-02-28 ENCOUNTER — HOSPITAL ENCOUNTER (OUTPATIENT)
Dept: ULTRASOUND IMAGING | Facility: CLINIC | Age: 33
Discharge: HOME | End: 2023-02-28
Attending: ADVANCED PRACTICE MIDWIFE
Payer: COMMERCIAL

## 2023-02-28 DIAGNOSIS — R76.8 SS-A ANTIBODY POSITIVE: ICD-10-CM

## 2023-02-28 DIAGNOSIS — O26.90: Primary | ICD-10-CM

## 2023-02-28 PROCEDURE — 76805 OB US >/= 14 WKS SNGL FETUS: CPT

## 2023-02-28 PROCEDURE — 76828 ECHO EXAM OF FETAL HEART: CPT

## 2023-02-28 PROCEDURE — 76805 OB US >/= 14 WKS SNGL FETUS: CPT | Mod: 26 | Performed by: OBSTETRICS & GYNECOLOGY

## 2023-03-15 ENCOUNTER — HOSPITAL ENCOUNTER (OUTPATIENT)
Dept: ULTRASOUND IMAGING | Facility: CLINIC | Age: 33
Discharge: HOME | End: 2023-03-15
Attending: OBSTETRICS & GYNECOLOGY
Payer: COMMERCIAL

## 2023-03-15 ENCOUNTER — OFFICE VISIT (OUTPATIENT)
Dept: MATERNAL FETAL MEDICINE | Facility: CLINIC | Age: 33
End: 2023-03-15
Attending: OBSTETRICS & GYNECOLOGY
Payer: COMMERCIAL

## 2023-03-15 DIAGNOSIS — R76.8 SS-A ANTIBODY POSITIVE: ICD-10-CM

## 2023-03-15 DIAGNOSIS — O26.90: ICD-10-CM

## 2023-03-15 DIAGNOSIS — R76.8 SS-A ANTIBODY POSITIVE: Primary | ICD-10-CM

## 2023-03-15 PROCEDURE — 76815 OB US LIMITED FETUS(S): CPT

## 2023-03-15 PROCEDURE — 76815 OB US LIMITED FETUS(S): CPT | Mod: 26 | Performed by: OBSTETRICS & GYNECOLOGY

## 2023-03-15 PROCEDURE — 76828 ECHO EXAM OF FETAL HEART: CPT

## 2023-03-15 NOTE — PROGRESS NOTES
"Please see \"Imaging\" tab under Chart Review for full details.    Sharon Kim MD  Maternal Fetal Medicine    "

## 2023-03-15 NOTE — NURSING NOTE
Patient presents to M for L2. Positive fetal movement. Denies LOF, vaginal bleeding or cramping/contractions. SBAR given to MFBIRDIE MD, see their note in Epic.

## 2023-04-04 ENCOUNTER — HOSPITAL ENCOUNTER (OUTPATIENT)
Dept: ULTRASOUND IMAGING | Facility: CLINIC | Age: 33
Discharge: HOME | End: 2023-04-04
Attending: OBSTETRICS & GYNECOLOGY
Payer: COMMERCIAL

## 2023-04-04 ENCOUNTER — OFFICE VISIT (OUTPATIENT)
Dept: MATERNAL FETAL MEDICINE | Facility: CLINIC | Age: 33
End: 2023-04-04
Attending: OBSTETRICS & GYNECOLOGY
Payer: COMMERCIAL

## 2023-04-04 DIAGNOSIS — O26.90: ICD-10-CM

## 2023-04-04 DIAGNOSIS — R76.8 SS-A ANTIBODY POSITIVE: ICD-10-CM

## 2023-04-04 DIAGNOSIS — O16.2 HYPERTENSION AFFECTING PREGNANCY IN SECOND TRIMESTER: Primary | ICD-10-CM

## 2023-04-04 PROCEDURE — 76811 OB US DETAILED SNGL FETUS: CPT

## 2023-04-04 PROCEDURE — 76811 OB US DETAILED SNGL FETUS: CPT | Mod: 26 | Performed by: OBSTETRICS & GYNECOLOGY

## 2023-04-04 NOTE — PROGRESS NOTES
"Please see \"Imaging\" tab under \"Chart Review\" for details of today's US at the [facility].    Kip Jane MD  Maternal-Fetal Medicine    "

## 2023-05-15 ENCOUNTER — LAB REQUISITION (OUTPATIENT)
Dept: LAB | Facility: CLINIC | Age: 33
End: 2023-05-15

## 2023-05-15 DIAGNOSIS — Z36.89 ENCOUNTER FOR OTHER SPECIFIED ANTENATAL SCREENING: ICD-10-CM

## 2023-05-15 DIAGNOSIS — Z01.83 ENCOUNTER FOR BLOOD TYPING: ICD-10-CM

## 2023-05-15 LAB — HGB BLD-MCNC: 12.4 G/DL (ref 11.7–15.7)

## 2023-05-15 PROCEDURE — 85018 HEMOGLOBIN: CPT | Performed by: OBSTETRICS & GYNECOLOGY

## 2023-05-15 PROCEDURE — 86850 RBC ANTIBODY SCREEN: CPT | Performed by: OBSTETRICS & GYNECOLOGY

## 2023-05-15 PROCEDURE — 86592 SYPHILIS TEST NON-TREP QUAL: CPT | Performed by: OBSTETRICS & GYNECOLOGY

## 2023-05-16 DIAGNOSIS — O16.9 UNSPECIFIED HYPERTENSION ANTEPARTUM(642.93): Primary | ICD-10-CM

## 2023-05-16 LAB
ANTIBODY SCREEN: NEGATIVE
RPR SER QL: NONREACTIVE
SPECIMEN EXPIRATION DATE: NORMAL

## 2023-07-08 ENCOUNTER — HOSPITAL ENCOUNTER (OUTPATIENT)
Facility: CLINIC | Age: 33
Discharge: HOME | End: 2023-07-08
Attending: OBSTETRICS & GYNECOLOGY | Admitting: OBSTETRICS & GYNECOLOGY
Payer: COMMERCIAL

## 2023-07-08 ENCOUNTER — HOSPITAL ENCOUNTER (OUTPATIENT)
Facility: CLINIC | Age: 33
End: 2023-07-08
Admitting: OBSTETRICS & GYNECOLOGY
Payer: COMMERCIAL

## 2023-07-08 VITALS
HEIGHT: 62 IN | RESPIRATION RATE: 22 BRPM | BODY MASS INDEX: 38.59 KG/M2 | DIASTOLIC BLOOD PRESSURE: 80 MMHG | OXYGEN SATURATION: 96 % | TEMPERATURE: 98 F | SYSTOLIC BLOOD PRESSURE: 128 MMHG

## 2023-07-08 LAB
ALBUMIN SERPL BCG-MCNC: 3.4 G/DL (ref 3.5–5.2)
ALBUMIN UR-MCNC: NEGATIVE MG/DL
ALP SERPL-CCNC: 76 U/L (ref 35–104)
ALT SERPL W P-5'-P-CCNC: 8 U/L (ref 0–50)
ANION GAP SERPL CALCULATED.3IONS-SCNC: 10 MMOL/L (ref 7–15)
APPEARANCE UR: CLEAR
AST SERPL W P-5'-P-CCNC: 22 U/L (ref 0–45)
BILIRUB SERPL-MCNC: 0.3 MG/DL
BILIRUB UR QL STRIP: NEGATIVE
BUN SERPL-MCNC: 5 MG/DL (ref 6–20)
CALCIUM SERPL-MCNC: 8.9 MG/DL (ref 8.6–10)
CHLORIDE SERPL-SCNC: 103 MMOL/L (ref 98–107)
COLOR UR AUTO: NORMAL
CREAT SERPL-MCNC: 0.46 MG/DL (ref 0.51–0.95)
DEPRECATED HCO3 PLAS-SCNC: 21 MMOL/L (ref 22–29)
GFR SERPL CREATININE-BSD FRML MDRD: >90 ML/MIN/1.73M2
GLUCOSE SERPL-MCNC: 85 MG/DL (ref 70–99)
GLUCOSE UR STRIP-MCNC: NEGATIVE MG/DL
HGB UR QL STRIP: NEGATIVE
KETONES UR STRIP-MCNC: NEGATIVE MG/DL
LEUKOCYTE ESTERASE UR QL STRIP: NEGATIVE
NITRATE UR QL: NEGATIVE
PH UR STRIP: 7 [PH] (ref 5–7)
POTASSIUM SERPL-SCNC: 3.5 MMOL/L (ref 3.4–5.3)
PROT SERPL-MCNC: 6.4 G/DL (ref 6.4–8.3)
SODIUM SERPL-SCNC: 134 MMOL/L (ref 136–145)
SP GR UR STRIP: 1.01 (ref 1–1.03)
UROBILINOGEN UR STRIP-MCNC: NORMAL MG/DL

## 2023-07-08 PROCEDURE — 250N000011 HC RX IP 250 OP 636: Mod: JZ | Performed by: OBSTETRICS & GYNECOLOGY

## 2023-07-08 PROCEDURE — 81003 URINALYSIS AUTO W/O SCOPE: CPT | Performed by: OBSTETRICS & GYNECOLOGY

## 2023-07-08 PROCEDURE — 36415 COLL VENOUS BLD VENIPUNCTURE: CPT | Performed by: OBSTETRICS & GYNECOLOGY

## 2023-07-08 PROCEDURE — 96372 THER/PROPH/DIAG INJ SC/IM: CPT | Performed by: OBSTETRICS & GYNECOLOGY

## 2023-07-08 PROCEDURE — 80053 COMPREHEN METABOLIC PANEL: CPT | Performed by: OBSTETRICS & GYNECOLOGY

## 2023-07-08 PROCEDURE — G0463 HOSPITAL OUTPT CLINIC VISIT: HCPCS

## 2023-07-08 PROCEDURE — 250N000013 HC RX MED GY IP 250 OP 250 PS 637: Performed by: OBSTETRICS & GYNECOLOGY

## 2023-07-08 RX ORDER — TERBUTALINE SULFATE 1 MG/ML
0.25 INJECTION, SOLUTION SUBCUTANEOUS ONCE
Status: COMPLETED | OUTPATIENT
Start: 2023-07-08 | End: 2023-07-08

## 2023-07-08 RX ORDER — HYDROXYZINE HYDROCHLORIDE 50 MG/1
100 TABLET, FILM COATED ORAL ONCE
Status: COMPLETED | OUTPATIENT
Start: 2023-07-08 | End: 2023-07-08

## 2023-07-08 RX ADMIN — TERBUTALINE SULFATE 0.25 MG: 1 INJECTION, SOLUTION SUBCUTANEOUS at 20:58

## 2023-07-08 RX ADMIN — HYDROXYZINE HYDROCHLORIDE 100 MG: 50 TABLET, FILM COATED ORAL at 22:17

## 2023-07-08 ASSESSMENT — ACTIVITIES OF DAILY LIVING (ADL)
WALKING_OR_CLIMBING_STAIRS_DIFFICULTY: NO
WEAR_GLASSES_OR_BLIND: YES
DIFFICULTY_COMMUNICATING: NO
TOILETING_ISSUES: NO
FALL_HISTORY_WITHIN_LAST_SIX_MONTHS: NO
HEARING_DIFFICULTY_OR_DEAF: NO
CONCENTRATING,_REMEMBERING_OR_MAKING_DECISIONS_DIFFICULTY: NO
DRESSING/BATHING_DIFFICULTY: NO
ADLS_ACUITY_SCORE: 31
ADLS_ACUITY_SCORE: 20
CHANGE_IN_FUNCTIONAL_STATUS_SINCE_ONSET_OF_CURRENT_ILLNESS/INJURY: NO
DOING_ERRANDS_INDEPENDENTLY_DIFFICULTY: NO
DIFFICULTY_EATING/SWALLOWING: NO

## 2023-07-08 NOTE — PLAN OF CARE
"Goal Outcome Evaluation:        Data: Patient presented to [facility]: 2023  5:48 PM.  Reason for maternal/fetal assessment is uterine contractions, abdominal pain. Patient reports Pain \"all over\" in abdomen-worse on right side since approx 1500 today, seems to be getting worse. Spoke to Dr Castillo at 1630 and was told to come in for eval if not improving.  Patient is a .  Prenatal record reviewed. Pregnancy  has been complicated by chronic hypertension.  Gestational Age 35w2d. VSS. Fetal movement active. Patient denies leaking of vaginal fluid/rupture of membranes, vaginal bleeding, nausea, vomiting, headache, visual disturbances, epigastric or URQ pain, significant edema. Support person is present. Ross  Action: Verbal consent for EFM. Triage assessment completed. Bill of rights reviewed.  Response: Patient verbalized agreement with plan. Will contact Dr Deysi Castillo with update and for further orders.     VE closed/30?/-3. External os funnels to tighly closed                 "

## 2023-07-08 NOTE — PLAN OF CARE
Goal Outcome Evaluation:           Pt VE closed-contractions palpate mild. No intercourse in last 24 hrs, no UTI symptoms. Regular BM's and did go today. States she drinks approx 100 oz of water a day and ate last at 1430

## 2023-07-09 NOTE — PROVIDER NOTIFICATION
07/08/23 7501   Provider Notification   Provider Name/Title    Method of Notification Phone   Notification Reason Status Update      updated that FHR category 1. Terbutaline given and contractions now Q 2-5 in 10mins feeling less stronger, palpating mild-moderate. Pain 3/10 now from 6/10.     MD discussed plan on phone with patient.     TORB for atarax 100mg and then discharge home. Pt has next clinic appt on 7/20/23.

## 2023-07-09 NOTE — PLAN OF CARE
Goal Outcome Evaluation:         Spoke to Dr Castillo. Updated re UC's and Cat 1 strip/ closed cervix. Reviewed pt hx and symptoms. TORB to send UA and CMP on pt- encourage po hydration. If UA appears concentrated, may need 1 liter IV fluid. Update MD with lab results then will decide if discharge home ( with Vistaril) or re-evaluate

## 2023-07-09 NOTE — CARE PLAN
Data: Patient assessed in the [facility] for uterine contractions.  Cervical exam closed, thick and soft.  Membranes intact.  Contractions/uterine assessment 2-5 in 10 mins.  Action:  Presumed adequate fetal oxygenation documented (see flow record). Discharge instructions reviewed.  Patient instructed to report change in fetal movement, vaginal leaking of fluid or bleeding, abdominal pain, or any concerns related to the pregnancy to her nurse/physician.    Response: Orders to discharge home per Deysi Castillo.  Patient verbalized understanding of education and verbalized agreement with plan. Discharged to home at 2220.

## 2023-07-09 NOTE — DISCHARGE INSTRUCTIONS
Discharge Instruction for Undelivered Patients      You were seen for: Labor Assessment  We Consulted:   You had (Test or Medicine): FHR monitoring, UA, CMP, cervix check, terbutaline, atarax     Diet:   Drink 8 to 12 glasses of liquids (milk, juice, water) every day.  You may eat meals and snacks.     Activity:  Call your doctor or nurse midwife if your baby is moving less than usual.     Call your provider if you notice:  Swelling in your face or increased swelling in your hands or legs.  Headaches that are not relieved by Tylenol (acetaminophen).  Changes in your vision (blurring: seeing spots or stars.)  Nausea (sick to your stomach) and vomiting (throwing up).   Weight gain of 5 pounds or more per week.  Heartburn that doesn't go away.  Signs of bladder infection: pain when you urinate (use the toilet), need to go more often and more urgently.  The bag of jalloh (rupture of membranes) breaks, or you notice leaking in your underwear.  Bright red blood in your underwear.  Abdominal (lower belly) or stomach pain.  Second (plus) baby: Contractions (tightening) less than 10 minutes apart and getting stronger.  Increase or change in vaginal discharge (note the color and amount)    Follow-up:  As scheduled in the clinic

## 2023-07-09 NOTE — PROVIDER NOTIFICATION
07/08/23 2022   Provider Notification   Provider Name/Title    Method of Notification Phone   Notification Reason Status Update      updated that CMP as per flow sheet. UA negative. Pt PO hydrating well. FHR moderate variability, accels present, no decels. Ctx Q1.5-3.5 mins apart, pt states some ctx feeling a little bit stronger, palpating mild to moderate. SVE rechecked internal os closed/30/high. TORB for terbutaline x1 and reevaluate ctxs.

## 2023-07-20 ENCOUNTER — LAB REQUISITION (OUTPATIENT)
Dept: LAB | Facility: CLINIC | Age: 33
End: 2023-07-20
Payer: COMMERCIAL

## 2023-07-20 DIAGNOSIS — Z3A.37 37 WEEKS GESTATION OF PREGNANCY: ICD-10-CM

## 2023-07-20 PROCEDURE — 87653 STREP B DNA AMP PROBE: CPT | Mod: ORL | Performed by: OBSTETRICS & GYNECOLOGY

## 2023-07-21 LAB — GP B STREP DNA SPEC QL NAA+PROBE: NEGATIVE

## 2023-07-28 ENCOUNTER — APPOINTMENT (OUTPATIENT)
Dept: GENERAL RADIOLOGY | Facility: CLINIC | Age: 33
End: 2023-07-28
Attending: OBSTETRICS & GYNECOLOGY
Payer: COMMERCIAL

## 2023-07-28 ENCOUNTER — HOSPITAL ENCOUNTER (INPATIENT)
Facility: CLINIC | Age: 33
LOS: 2 days | Discharge: HOME HEALTH | End: 2023-07-30
Attending: OBSTETRICS & GYNECOLOGY | Admitting: OBSTETRICS & GYNECOLOGY
Payer: COMMERCIAL

## 2023-07-28 ENCOUNTER — ANESTHESIA EVENT (OUTPATIENT)
Dept: OBGYN | Facility: CLINIC | Age: 33
End: 2023-07-28
Payer: COMMERCIAL

## 2023-07-28 ENCOUNTER — ANESTHESIA (OUTPATIENT)
Dept: OBGYN | Facility: CLINIC | Age: 33
End: 2023-07-28
Payer: COMMERCIAL

## 2023-07-28 DIAGNOSIS — Z98.891 S/P C-SECTION: Primary | ICD-10-CM

## 2023-07-28 LAB
ABO/RH(D): ABNORMAL
ALBUMIN MFR UR ELPH: 72.3 MG/DL
ALT SERPL W P-5'-P-CCNC: 10 U/L (ref 0–50)
ANTIBODY ID: NORMAL
ANTIBODY SCREEN: POSITIVE
AST SERPL W P-5'-P-CCNC: 23 U/L (ref 0–45)
CREAT SERPL-MCNC: 0.57 MG/DL (ref 0.51–0.95)
CREAT UR-MCNC: 44 MG/DL
ERYTHROCYTE [DISTWIDTH] IN BLOOD BY AUTOMATED COUNT: 13.1 % (ref 10–15)
GFR SERPL CREATININE-BSD FRML MDRD: >90 ML/MIN/1.73M2
HCT VFR BLD AUTO: 36 % (ref 35–47)
HGB BLD-MCNC: 12.4 G/DL (ref 11.7–15.7)
MCH RBC QN AUTO: 31.2 PG (ref 26.5–33)
MCHC RBC AUTO-ENTMCNC: 34.4 G/DL (ref 31.5–36.5)
MCV RBC AUTO: 91 FL (ref 78–100)
PLATELET # BLD AUTO: 180 10E3/UL (ref 150–450)
PROT/CREAT 24H UR: 1.64 MG/MG CR (ref 0–0.2)
RBC # BLD AUTO: 3.98 10E6/UL (ref 3.8–5.2)
SPECIMEN EXPIRATION DATE: ABNORMAL
SPECIMEN EXPIRATION DATE: NORMAL
T PALLIDUM AB SER QL: NONREACTIVE
WBC # BLD AUTO: 5.5 10E3/UL (ref 4–11)

## 2023-07-28 PROCEDURE — 84460 ALANINE AMINO (ALT) (SGPT): CPT | Performed by: OBSTETRICS & GYNECOLOGY

## 2023-07-28 PROCEDURE — 86901 BLOOD TYPING SEROLOGIC RH(D): CPT | Performed by: OBSTETRICS & GYNECOLOGY

## 2023-07-28 PROCEDURE — 999N000065 XR ABDOMEN PORT 1 VIEW

## 2023-07-28 PROCEDURE — 250N000011 HC RX IP 250 OP 636: Mod: JZ

## 2023-07-28 PROCEDURE — 250N000009 HC RX 250: Performed by: OBSTETRICS & GYNECOLOGY

## 2023-07-28 PROCEDURE — 250N000009 HC RX 250: Performed by: NURSE ANESTHETIST, CERTIFIED REGISTERED

## 2023-07-28 PROCEDURE — 250N000011 HC RX IP 250 OP 636: Performed by: OBSTETRICS & GYNECOLOGY

## 2023-07-28 PROCEDURE — 120N000012 HC R&B POSTPARTUM

## 2023-07-28 PROCEDURE — 36415 COLL VENOUS BLD VENIPUNCTURE: CPT | Performed by: OBSTETRICS & GYNECOLOGY

## 2023-07-28 PROCEDURE — 84450 TRANSFERASE (AST) (SGOT): CPT | Performed by: OBSTETRICS & GYNECOLOGY

## 2023-07-28 PROCEDURE — 258N000003 HC RX IP 258 OP 636: Performed by: OBSTETRICS & GYNECOLOGY

## 2023-07-28 PROCEDURE — 272N000001 HC OR GENERAL SUPPLY STERILE: Performed by: OBSTETRICS & GYNECOLOGY

## 2023-07-28 PROCEDURE — 250N000011 HC RX IP 250 OP 636: Performed by: ANESTHESIOLOGY

## 2023-07-28 PROCEDURE — 250N000013 HC RX MED GY IP 250 OP 250 PS 637

## 2023-07-28 PROCEDURE — 250N000011 HC RX IP 250 OP 636: Performed by: NURSE ANESTHETIST, CERTIFIED REGISTERED

## 2023-07-28 PROCEDURE — 86870 RBC ANTIBODY IDENTIFICATION: CPT | Performed by: OBSTETRICS & GYNECOLOGY

## 2023-07-28 PROCEDURE — 250N000011 HC RX IP 250 OP 636: Mod: JZ | Performed by: ANESTHESIOLOGY

## 2023-07-28 PROCEDURE — 82565 ASSAY OF CREATININE: CPT | Performed by: OBSTETRICS & GYNECOLOGY

## 2023-07-28 PROCEDURE — 370N000017 HC ANESTHESIA TECHNICAL FEE, PER MIN: Performed by: OBSTETRICS & GYNECOLOGY

## 2023-07-28 PROCEDURE — 85027 COMPLETE CBC AUTOMATED: CPT | Performed by: OBSTETRICS & GYNECOLOGY

## 2023-07-28 PROCEDURE — 84156 ASSAY OF PROTEIN URINE: CPT | Performed by: OBSTETRICS & GYNECOLOGY

## 2023-07-28 PROCEDURE — 250N000009 HC RX 250: Performed by: ANESTHESIOLOGY

## 2023-07-28 PROCEDURE — 86780 TREPONEMA PALLIDUM: CPT | Performed by: OBSTETRICS & GYNECOLOGY

## 2023-07-28 PROCEDURE — 250N000013 HC RX MED GY IP 250 OP 250 PS 637: Performed by: OBSTETRICS & GYNECOLOGY

## 2023-07-28 PROCEDURE — 258N000003 HC RX IP 258 OP 636: Performed by: NURSE ANESTHETIST, CERTIFIED REGISTERED

## 2023-07-28 PROCEDURE — 710N000009 HC RECOVERY PHASE 1, LEVEL 1, PER MIN: Performed by: OBSTETRICS & GYNECOLOGY

## 2023-07-28 PROCEDURE — 250N000011 HC RX IP 250 OP 636: Mod: JZ | Performed by: NURSE ANESTHETIST, CERTIFIED REGISTERED

## 2023-07-28 PROCEDURE — 86850 RBC ANTIBODY SCREEN: CPT | Performed by: OBSTETRICS & GYNECOLOGY

## 2023-07-28 PROCEDURE — 360N000076 HC SURGERY LEVEL 3, PER MIN: Performed by: OBSTETRICS & GYNECOLOGY

## 2023-07-28 RX ORDER — OXYTOCIN 10 [USP'U]/ML
10 INJECTION, SOLUTION INTRAMUSCULAR; INTRAVENOUS
Status: DISCONTINUED | OUTPATIENT
Start: 2023-07-28 | End: 2023-07-28 | Stop reason: HOSPADM

## 2023-07-28 RX ORDER — ACETAMINOPHEN 325 MG/1
975 TABLET ORAL EVERY 6 HOURS
Status: DISCONTINUED | OUTPATIENT
Start: 2023-07-28 | End: 2023-07-30 | Stop reason: HOSPADM

## 2023-07-28 RX ORDER — LIDOCAINE 40 MG/G
CREAM TOPICAL
Status: DISCONTINUED | OUTPATIENT
Start: 2023-07-28 | End: 2023-07-28 | Stop reason: HOSPADM

## 2023-07-28 RX ORDER — LABETALOL HYDROCHLORIDE 5 MG/ML
20 INJECTION, SOLUTION INTRAVENOUS
Status: DISCONTINUED | OUTPATIENT
Start: 2023-07-28 | End: 2023-07-30 | Stop reason: HOSPADM

## 2023-07-28 RX ORDER — DEXAMETHASONE SODIUM PHOSPHATE 4 MG/ML
INJECTION, SOLUTION INTRA-ARTICULAR; INTRALESIONAL; INTRAMUSCULAR; INTRAVENOUS; SOFT TISSUE PRN
Status: DISCONTINUED | OUTPATIENT
Start: 2023-07-28 | End: 2023-07-28

## 2023-07-28 RX ORDER — PROCHLORPERAZINE MALEATE 5 MG
10 TABLET ORAL EVERY 6 HOURS PRN
Status: DISCONTINUED | OUTPATIENT
Start: 2023-07-28 | End: 2023-07-28 | Stop reason: HOSPADM

## 2023-07-28 RX ORDER — METHYLERGONOVINE MALEATE 0.2 MG/ML
200 INJECTION INTRAVENOUS
Status: DISCONTINUED | OUTPATIENT
Start: 2023-07-28 | End: 2023-07-30 | Stop reason: HOSPADM

## 2023-07-28 RX ORDER — ONDANSETRON 4 MG/1
4 TABLET, ORALLY DISINTEGRATING ORAL EVERY 6 HOURS PRN
Status: DISCONTINUED | OUTPATIENT
Start: 2023-07-28 | End: 2023-07-30 | Stop reason: HOSPADM

## 2023-07-28 RX ORDER — IBUPROFEN 400 MG/1
800 TABLET, FILM COATED ORAL EVERY 6 HOURS
Status: DISCONTINUED | OUTPATIENT
Start: 2023-07-29 | End: 2023-07-30 | Stop reason: HOSPADM

## 2023-07-28 RX ORDER — METHYLERGONOVINE MALEATE 0.2 MG/ML
200 INJECTION INTRAVENOUS
Status: DISCONTINUED | OUTPATIENT
Start: 2023-07-28 | End: 2023-07-28

## 2023-07-28 RX ORDER — PROCHLORPERAZINE 25 MG
25 SUPPOSITORY, RECTAL RECTAL EVERY 12 HOURS PRN
Status: DISCONTINUED | OUTPATIENT
Start: 2023-07-28 | End: 2023-07-28 | Stop reason: HOSPADM

## 2023-07-28 RX ORDER — NALOXONE HYDROCHLORIDE 0.4 MG/ML
0.4 INJECTION, SOLUTION INTRAMUSCULAR; INTRAVENOUS; SUBCUTANEOUS
Status: DISCONTINUED | OUTPATIENT
Start: 2023-07-28 | End: 2023-07-28 | Stop reason: HOSPADM

## 2023-07-28 RX ORDER — OXYTOCIN/0.9 % SODIUM CHLORIDE 30/500 ML
1-24 PLASTIC BAG, INJECTION (ML) INTRAVENOUS CONTINUOUS
Status: DISCONTINUED | OUTPATIENT
Start: 2023-07-28 | End: 2023-07-28 | Stop reason: HOSPADM

## 2023-07-28 RX ORDER — CEFAZOLIN SODIUM 1 G/3ML
INJECTION, POWDER, FOR SOLUTION INTRAMUSCULAR; INTRAVENOUS PRN
Status: DISCONTINUED | OUTPATIENT
Start: 2023-07-28 | End: 2023-07-28

## 2023-07-28 RX ORDER — OXYCODONE HYDROCHLORIDE 5 MG/1
5 TABLET ORAL EVERY 4 HOURS PRN
Status: DISCONTINUED | OUTPATIENT
Start: 2023-07-28 | End: 2023-07-30 | Stop reason: HOSPADM

## 2023-07-28 RX ORDER — AMOXICILLIN 250 MG
1 CAPSULE ORAL 2 TIMES DAILY
Status: DISCONTINUED | OUTPATIENT
Start: 2023-07-28 | End: 2023-07-30 | Stop reason: HOSPADM

## 2023-07-28 RX ORDER — IBUPROFEN 400 MG/1
800 TABLET, FILM COATED ORAL
Status: DISCONTINUED | OUTPATIENT
Start: 2023-07-28 | End: 2023-07-28

## 2023-07-28 RX ORDER — AMOXICILLIN 250 MG
2 CAPSULE ORAL 2 TIMES DAILY
Status: DISCONTINUED | OUTPATIENT
Start: 2023-07-28 | End: 2023-07-30 | Stop reason: HOSPADM

## 2023-07-28 RX ORDER — KETOROLAC TROMETHAMINE 30 MG/ML
30 INJECTION, SOLUTION INTRAMUSCULAR; INTRAVENOUS
Status: DISCONTINUED | OUTPATIENT
Start: 2023-07-28 | End: 2023-07-28

## 2023-07-28 RX ORDER — LABETALOL 200 MG/1
200 TABLET, FILM COATED ORAL DAILY
Status: DISCONTINUED | OUTPATIENT
Start: 2023-07-28 | End: 2023-07-28 | Stop reason: ALTCHOICE

## 2023-07-28 RX ORDER — CITRIC ACID/SODIUM CITRATE 334-500MG
30 SOLUTION, ORAL ORAL
Status: DISCONTINUED | OUTPATIENT
Start: 2023-07-28 | End: 2023-07-28 | Stop reason: HOSPADM

## 2023-07-28 RX ORDER — EPHEDRINE SULFATE 50 MG/ML
5 INJECTION, SOLUTION INTRAMUSCULAR; INTRAVENOUS; SUBCUTANEOUS
Status: DISCONTINUED | OUTPATIENT
Start: 2023-07-28 | End: 2023-07-28 | Stop reason: HOSPADM

## 2023-07-28 RX ORDER — ONDANSETRON 4 MG/1
4 TABLET, ORALLY DISINTEGRATING ORAL EVERY 6 HOURS PRN
Status: DISCONTINUED | OUTPATIENT
Start: 2023-07-28 | End: 2023-07-28 | Stop reason: HOSPADM

## 2023-07-28 RX ORDER — TRANEXAMIC ACID 10 MG/ML
1 INJECTION, SOLUTION INTRAVENOUS EVERY 30 MIN PRN
Status: DISCONTINUED | OUTPATIENT
Start: 2023-07-28 | End: 2023-07-28 | Stop reason: HOSPADM

## 2023-07-28 RX ORDER — NIFEDIPINE 10 MG/1
10-20 CAPSULE ORAL
Status: DISCONTINUED | OUTPATIENT
Start: 2023-07-28 | End: 2023-07-30 | Stop reason: HOSPADM

## 2023-07-28 RX ORDER — NALOXONE HYDROCHLORIDE 0.4 MG/ML
0.2 INJECTION, SOLUTION INTRAMUSCULAR; INTRAVENOUS; SUBCUTANEOUS
Status: DISCONTINUED | OUTPATIENT
Start: 2023-07-28 | End: 2023-07-30 | Stop reason: HOSPADM

## 2023-07-28 RX ORDER — ONDANSETRON 2 MG/ML
INJECTION INTRAMUSCULAR; INTRAVENOUS PRN
Status: DISCONTINUED | OUTPATIENT
Start: 2023-07-28 | End: 2023-07-28

## 2023-07-28 RX ORDER — PROCHLORPERAZINE 25 MG
25 SUPPOSITORY, RECTAL RECTAL EVERY 12 HOURS PRN
Status: DISCONTINUED | OUTPATIENT
Start: 2023-07-28 | End: 2023-07-30 | Stop reason: HOSPADM

## 2023-07-28 RX ORDER — NALOXONE HYDROCHLORIDE 0.4 MG/ML
0.2 INJECTION, SOLUTION INTRAMUSCULAR; INTRAVENOUS; SUBCUTANEOUS
Status: DISCONTINUED | OUTPATIENT
Start: 2023-07-28 | End: 2023-07-28 | Stop reason: HOSPADM

## 2023-07-28 RX ORDER — METOCLOPRAMIDE 10 MG/1
10 TABLET ORAL EVERY 6 HOURS PRN
Status: DISCONTINUED | OUTPATIENT
Start: 2023-07-28 | End: 2023-07-28 | Stop reason: HOSPADM

## 2023-07-28 RX ORDER — OXYTOCIN/0.9 % SODIUM CHLORIDE 30/500 ML
100-340 PLASTIC BAG, INJECTION (ML) INTRAVENOUS CONTINUOUS PRN
Status: DISCONTINUED | OUTPATIENT
Start: 2023-07-28 | End: 2023-07-30 | Stop reason: HOSPADM

## 2023-07-28 RX ORDER — PRENATAL VIT/IRON FUM/FOLIC AC 27MG-0.8MG
1 TABLET ORAL DAILY
Status: DISCONTINUED | OUTPATIENT
Start: 2023-07-28 | End: 2023-07-30 | Stop reason: HOSPADM

## 2023-07-28 RX ORDER — SODIUM CHLORIDE, SODIUM LACTATE, POTASSIUM CHLORIDE, CALCIUM CHLORIDE 600; 310; 30; 20 MG/100ML; MG/100ML; MG/100ML; MG/100ML
INJECTION, SOLUTION INTRAVENOUS CONTINUOUS
Status: DISCONTINUED | OUTPATIENT
Start: 2023-07-28 | End: 2023-07-28 | Stop reason: HOSPADM

## 2023-07-28 RX ORDER — OXYTOCIN/0.9 % SODIUM CHLORIDE 30/500 ML
340 PLASTIC BAG, INJECTION (ML) INTRAVENOUS CONTINUOUS PRN
Status: DISCONTINUED | OUTPATIENT
Start: 2023-07-28 | End: 2023-07-30 | Stop reason: HOSPADM

## 2023-07-28 RX ORDER — METOCLOPRAMIDE HYDROCHLORIDE 5 MG/ML
10 INJECTION INTRAMUSCULAR; INTRAVENOUS EVERY 6 HOURS PRN
Status: DISCONTINUED | OUTPATIENT
Start: 2023-07-28 | End: 2023-07-30 | Stop reason: HOSPADM

## 2023-07-28 RX ORDER — OXYTOCIN 10 [USP'U]/ML
10 INJECTION, SOLUTION INTRAMUSCULAR; INTRAVENOUS
Status: DISCONTINUED | OUTPATIENT
Start: 2023-07-28 | End: 2023-07-30 | Stop reason: HOSPADM

## 2023-07-28 RX ORDER — OXYTOCIN/0.9 % SODIUM CHLORIDE 30/500 ML
PLASTIC BAG, INJECTION (ML) INTRAVENOUS CONTINUOUS PRN
Status: DISCONTINUED | OUTPATIENT
Start: 2023-07-28 | End: 2023-07-28

## 2023-07-28 RX ORDER — ACETAMINOPHEN 325 MG/1
TABLET ORAL
Status: DISCONTINUED
Start: 2023-07-28 | End: 2023-07-28 | Stop reason: HOSPADM

## 2023-07-28 RX ORDER — SIMETHICONE 80 MG
80 TABLET,CHEWABLE ORAL 4 TIMES DAILY PRN
Status: DISCONTINUED | OUTPATIENT
Start: 2023-07-28 | End: 2023-07-30 | Stop reason: HOSPADM

## 2023-07-28 RX ORDER — HYDROMORPHONE HCL IN WATER/PF 6 MG/30 ML
0.2 PATIENT CONTROLLED ANALGESIA SYRINGE INTRAVENOUS
Status: DISCONTINUED | OUTPATIENT
Start: 2023-07-28 | End: 2023-07-30 | Stop reason: HOSPADM

## 2023-07-28 RX ORDER — DEXTROSE, SODIUM CHLORIDE, SODIUM LACTATE, POTASSIUM CHLORIDE, AND CALCIUM CHLORIDE 5; .6; .31; .03; .02 G/100ML; G/100ML; G/100ML; G/100ML; G/100ML
INJECTION, SOLUTION INTRAVENOUS CONTINUOUS
Status: DISCONTINUED | OUTPATIENT
Start: 2023-07-28 | End: 2023-07-30

## 2023-07-28 RX ORDER — OXYTOCIN/0.9 % SODIUM CHLORIDE 30/500 ML
340 PLASTIC BAG, INJECTION (ML) INTRAVENOUS CONTINUOUS PRN
Status: DISCONTINUED | OUTPATIENT
Start: 2023-07-28 | End: 2023-07-28 | Stop reason: HOSPADM

## 2023-07-28 RX ORDER — KETOROLAC TROMETHAMINE 30 MG/ML
INJECTION, SOLUTION INTRAMUSCULAR; INTRAVENOUS PRN
Status: DISCONTINUED | OUTPATIENT
Start: 2023-07-28 | End: 2023-07-28

## 2023-07-28 RX ORDER — MORPHINE SULFATE 1 MG/ML
INJECTION, SOLUTION EPIDURAL; INTRATHECAL; INTRAVENOUS PRN
Status: DISCONTINUED | OUTPATIENT
Start: 2023-07-28 | End: 2023-07-28

## 2023-07-28 RX ORDER — CHLOROPROCAINE HYDROCHLORIDE 30 MG/ML
INJECTION, SOLUTION EPIDURAL; INFILTRATION; INTRACAUDAL; PERINEURAL PRN
Status: DISCONTINUED | OUTPATIENT
Start: 2023-07-28 | End: 2023-07-28

## 2023-07-28 RX ORDER — HYDROCORTISONE 25 MG/G
CREAM TOPICAL 3 TIMES DAILY PRN
Status: DISCONTINUED | OUTPATIENT
Start: 2023-07-28 | End: 2023-07-30 | Stop reason: HOSPADM

## 2023-07-28 RX ORDER — NALBUPHINE HYDROCHLORIDE 20 MG/ML
2.5-5 INJECTION, SOLUTION INTRAMUSCULAR; INTRAVENOUS; SUBCUTANEOUS EVERY 6 HOURS PRN
Status: DISCONTINUED | OUTPATIENT
Start: 2023-07-28 | End: 2023-07-30 | Stop reason: HOSPADM

## 2023-07-28 RX ORDER — MISOPROSTOL 200 UG/1
400 TABLET ORAL
Status: DISCONTINUED | OUTPATIENT
Start: 2023-07-28 | End: 2023-07-28 | Stop reason: HOSPADM

## 2023-07-28 RX ORDER — HYDROMORPHONE HCL IN WATER/PF 6 MG/30 ML
PATIENT CONTROLLED ANALGESIA SYRINGE INTRAVENOUS
Status: COMPLETED
Start: 2023-07-28 | End: 2023-07-28

## 2023-07-28 RX ORDER — CARBOPROST TROMETHAMINE 250 UG/ML
250 INJECTION, SOLUTION INTRAMUSCULAR
Status: DISCONTINUED | OUTPATIENT
Start: 2023-07-28 | End: 2023-07-30 | Stop reason: HOSPADM

## 2023-07-28 RX ORDER — ONDANSETRON 2 MG/ML
4 INJECTION INTRAMUSCULAR; INTRAVENOUS EVERY 6 HOURS PRN
Status: DISCONTINUED | OUTPATIENT
Start: 2023-07-28 | End: 2023-07-30 | Stop reason: HOSPADM

## 2023-07-28 RX ORDER — PROCHLORPERAZINE MALEATE 10 MG
10 TABLET ORAL EVERY 6 HOURS PRN
Status: DISCONTINUED | OUTPATIENT
Start: 2023-07-28 | End: 2023-07-30 | Stop reason: HOSPADM

## 2023-07-28 RX ORDER — TRANEXAMIC ACID 10 MG/ML
1 INJECTION, SOLUTION INTRAVENOUS EVERY 30 MIN PRN
Status: DISCONTINUED | OUTPATIENT
Start: 2023-07-28 | End: 2023-07-30 | Stop reason: HOSPADM

## 2023-07-28 RX ORDER — MISOPROSTOL 200 UG/1
400 TABLET ORAL
Status: DISCONTINUED | OUTPATIENT
Start: 2023-07-28 | End: 2023-07-30 | Stop reason: HOSPADM

## 2023-07-28 RX ORDER — BISACODYL 10 MG
10 SUPPOSITORY, RECTAL RECTAL DAILY PRN
Status: DISCONTINUED | OUTPATIENT
Start: 2023-07-30 | End: 2023-07-30 | Stop reason: HOSPADM

## 2023-07-28 RX ORDER — NALOXONE HYDROCHLORIDE 0.4 MG/ML
0.4 INJECTION, SOLUTION INTRAMUSCULAR; INTRAVENOUS; SUBCUTANEOUS
Status: DISCONTINUED | OUTPATIENT
Start: 2023-07-28 | End: 2023-07-30 | Stop reason: HOSPADM

## 2023-07-28 RX ORDER — CARBOPROST TROMETHAMINE 250 UG/ML
250 INJECTION, SOLUTION INTRAMUSCULAR
Status: DISCONTINUED | OUTPATIENT
Start: 2023-07-28 | End: 2023-07-28 | Stop reason: HOSPADM

## 2023-07-28 RX ORDER — LIDOCAINE 40 MG/G
CREAM TOPICAL
Status: DISCONTINUED | OUTPATIENT
Start: 2023-07-28 | End: 2023-07-30 | Stop reason: HOSPADM

## 2023-07-28 RX ORDER — LIDOCAINE HYDROCHLORIDE AND EPINEPHRINE 15; 5 MG/ML; UG/ML
INJECTION, SOLUTION EPIDURAL PRN
Status: DISCONTINUED | OUTPATIENT
Start: 2023-07-28 | End: 2023-07-28

## 2023-07-28 RX ORDER — SODIUM CHLORIDE, SODIUM LACTATE, POTASSIUM CHLORIDE, CALCIUM CHLORIDE 600; 310; 30; 20 MG/100ML; MG/100ML; MG/100ML; MG/100ML
INJECTION, SOLUTION INTRAVENOUS CONTINUOUS PRN
Status: DISCONTINUED | OUTPATIENT
Start: 2023-07-28 | End: 2023-07-28 | Stop reason: HOSPADM

## 2023-07-28 RX ORDER — ROPIVACAINE HYDROCHLORIDE 2 MG/ML
10 INJECTION, SOLUTION EPIDURAL; INFILTRATION; PERINEURAL ONCE
Status: COMPLETED | OUTPATIENT
Start: 2023-07-28 | End: 2023-07-28

## 2023-07-28 RX ORDER — AZITHROMYCIN 500 MG/1
INJECTION, POWDER, LYOPHILIZED, FOR SOLUTION INTRAVENOUS
Status: DISCONTINUED
Start: 2023-07-28 | End: 2023-07-28 | Stop reason: HOSPADM

## 2023-07-28 RX ORDER — LABETALOL 200 MG/1
200 TABLET, FILM COATED ORAL EVERY 12 HOURS SCHEDULED
Status: DISCONTINUED | OUTPATIENT
Start: 2023-07-28 | End: 2023-07-30 | Stop reason: HOSPADM

## 2023-07-28 RX ORDER — MISOPROSTOL 200 UG/1
800 TABLET ORAL
Status: DISCONTINUED | OUTPATIENT
Start: 2023-07-28 | End: 2023-07-30 | Stop reason: HOSPADM

## 2023-07-28 RX ORDER — NIFEDIPINE 10 MG/1
10 CAPSULE ORAL ONCE
Status: COMPLETED | OUTPATIENT
Start: 2023-07-28 | End: 2023-07-28

## 2023-07-28 RX ORDER — MODIFIED LANOLIN
OINTMENT (GRAM) TOPICAL
Status: DISCONTINUED | OUTPATIENT
Start: 2023-07-28 | End: 2023-07-30 | Stop reason: HOSPADM

## 2023-07-28 RX ORDER — METOCLOPRAMIDE 10 MG/1
10 TABLET ORAL EVERY 6 HOURS PRN
Status: DISCONTINUED | OUTPATIENT
Start: 2023-07-28 | End: 2023-07-30 | Stop reason: HOSPADM

## 2023-07-28 RX ORDER — ONDANSETRON 2 MG/ML
4 INJECTION INTRAMUSCULAR; INTRAVENOUS EVERY 6 HOURS PRN
Status: DISCONTINUED | OUTPATIENT
Start: 2023-07-28 | End: 2023-07-28 | Stop reason: HOSPADM

## 2023-07-28 RX ORDER — FENTANYL CITRATE 50 UG/ML
50 INJECTION, SOLUTION INTRAMUSCULAR; INTRAVENOUS EVERY 30 MIN PRN
Status: DISCONTINUED | OUTPATIENT
Start: 2023-07-28 | End: 2023-07-28 | Stop reason: HOSPADM

## 2023-07-28 RX ORDER — KETOROLAC TROMETHAMINE 30 MG/ML
30 INJECTION, SOLUTION INTRAMUSCULAR; INTRAVENOUS EVERY 6 HOURS
Status: COMPLETED | OUTPATIENT
Start: 2023-07-28 | End: 2023-07-29

## 2023-07-28 RX ORDER — NIFEDIPINE 10 MG/1
CAPSULE ORAL
Status: COMPLETED
Start: 2023-07-28 | End: 2023-07-28

## 2023-07-28 RX ORDER — MISOPROSTOL 200 UG/1
800 TABLET ORAL
Status: DISCONTINUED | OUTPATIENT
Start: 2023-07-28 | End: 2023-07-28 | Stop reason: HOSPADM

## 2023-07-28 RX ORDER — METOCLOPRAMIDE HYDROCHLORIDE 5 MG/ML
10 INJECTION INTRAMUSCULAR; INTRAVENOUS EVERY 6 HOURS PRN
Status: DISCONTINUED | OUTPATIENT
Start: 2023-07-28 | End: 2023-07-28 | Stop reason: HOSPADM

## 2023-07-28 RX ADMIN — ACETAMINOPHEN 975 MG: 325 TABLET, FILM COATED ORAL at 23:59

## 2023-07-28 RX ADMIN — SODIUM CHLORIDE, POTASSIUM CHLORIDE, SODIUM LACTATE AND CALCIUM CHLORIDE 1000 ML: 600; 310; 30; 20 INJECTION, SOLUTION INTRAVENOUS at 12:25

## 2023-07-28 RX ADMIN — ROPIVACAINE HYDROCHLORIDE 10 ML: 2 INJECTION, SOLUTION EPIDURAL; INFILTRATION at 13:08

## 2023-07-28 RX ADMIN — SODIUM CHLORIDE, POTASSIUM CHLORIDE, SODIUM LACTATE AND CALCIUM CHLORIDE: 600; 310; 30; 20 INJECTION, SOLUTION INTRAVENOUS at 09:50

## 2023-07-28 RX ADMIN — FLUOXETINE 20 MG: 20 CAPSULE ORAL at 20:09

## 2023-07-28 RX ADMIN — KETOROLAC TROMETHAMINE 30 MG: 30 INJECTION, SOLUTION INTRAMUSCULAR; INTRAVENOUS at 22:28

## 2023-07-28 RX ADMIN — Medication 0.2 MG: at 18:32

## 2023-07-28 RX ADMIN — HYDROMORPHONE HYDROCHLORIDE 0.2 MG: 0.2 INJECTION, SOLUTION INTRAMUSCULAR; INTRAVENOUS; SUBCUTANEOUS at 18:32

## 2023-07-28 RX ADMIN — LIDOCAINE HYDROCHLORIDE AND EPINEPHRINE 3 ML: 15; 5 INJECTION, SOLUTION EPIDURAL at 13:06

## 2023-07-28 RX ADMIN — LIDOCAINE HYDROCHLORIDE 10 ML: 20 INJECTION, SOLUTION EPIDURAL; INFILTRATION; INTRACAUDAL; PERINEURAL at 15:02

## 2023-07-28 RX ADMIN — SODIUM CHLORIDE, SODIUM LACTATE, POTASSIUM CHLORIDE, CALCIUM CHLORIDE AND DEXTROSE MONOHYDRATE: 5; 600; 310; 30; 20 INJECTION, SOLUTION INTRAVENOUS at 19:16

## 2023-07-28 RX ADMIN — SENNOSIDES AND DOCUSATE SODIUM 1 TABLET: 50; 8.6 TABLET ORAL at 20:09

## 2023-07-28 RX ADMIN — Medication: at 13:03

## 2023-07-28 RX ADMIN — Medication 20 MCG: at 15:18

## 2023-07-28 RX ADMIN — AZITHROMYCIN MONOHYDRATE 500 MG: 500 INJECTION, POWDER, LYOPHILIZED, FOR SOLUTION INTRAVENOUS at 15:04

## 2023-07-28 RX ADMIN — DEXAMETHASONE SODIUM PHOSPHATE 4 MG: 4 INJECTION, SOLUTION INTRA-ARTICULAR; INTRALESIONAL; INTRAMUSCULAR; INTRAVENOUS; SOFT TISSUE at 15:13

## 2023-07-28 RX ADMIN — ONDANSETRON 4 MG: 2 INJECTION INTRAMUSCULAR; INTRAVENOUS at 15:25

## 2023-07-28 RX ADMIN — ACETAMINOPHEN 975 MG: 325 TABLET, FILM COATED ORAL at 18:17

## 2023-07-28 RX ADMIN — FENTANYL CITRATE 50 MCG: 50 INJECTION, SOLUTION INTRAMUSCULAR; INTRAVENOUS at 11:35

## 2023-07-28 RX ADMIN — KETOROLAC TROMETHAMINE 30 MG: 30 INJECTION, SOLUTION INTRAMUSCULAR at 15:51

## 2023-07-28 RX ADMIN — LABETALOL HYDROCHLORIDE 200 MG: 200 TABLET, FILM COATED ORAL at 20:09

## 2023-07-28 RX ADMIN — Medication 600 ML/HR: at 15:11

## 2023-07-28 RX ADMIN — OXYCODONE HYDROCHLORIDE 5 MG: 5 TABLET ORAL at 20:09

## 2023-07-28 RX ADMIN — CHLOROPROCAINE HYDROCHLORIDE 7.5 ML: 30 INJECTION, SOLUTION EPIDURAL; INFILTRATION; INTRACAUDAL; PERINEURAL at 15:15

## 2023-07-28 RX ADMIN — Medication 2 MILLI-UNITS/MIN: at 10:56

## 2023-07-28 RX ADMIN — FENTANYL CITRATE 50 MCG: 50 INJECTION, SOLUTION INTRAMUSCULAR; INTRAVENOUS at 12:22

## 2023-07-28 RX ADMIN — MORPHINE SULFATE 2.5 MG: 1 INJECTION, SOLUTION EPIDURAL; INTRATHECAL; INTRAVENOUS at 15:15

## 2023-07-28 RX ADMIN — CHLOROPROCAINE HYDROCHLORIDE 10 ML: 30 INJECTION, SOLUTION EPIDURAL; INFILTRATION; INTRACAUDAL; PERINEURAL at 15:05

## 2023-07-28 RX ADMIN — NIFEDIPINE 10 MG: 10 CAPSULE ORAL at 18:12

## 2023-07-28 RX ADMIN — ONDANSETRON 4 MG: 2 INJECTION INTRAMUSCULAR; INTRAVENOUS at 15:12

## 2023-07-28 RX ADMIN — CEFAZOLIN 2 G: 1 INJECTION, POWDER, FOR SOLUTION INTRAMUSCULAR; INTRAVENOUS at 15:04

## 2023-07-28 ASSESSMENT — ACTIVITIES OF DAILY LIVING (ADL)
DOING_ERRANDS_INDEPENDENTLY_DIFFICULTY: NO
ADLS_ACUITY_SCORE: 20
TOILETING_ISSUES: NO
ADLS_ACUITY_SCORE: 20
ADLS_ACUITY_SCORE: 20
VISION_MANAGEMENT: GLASSES
FALL_HISTORY_WITHIN_LAST_SIX_MONTHS: NO
WEAR_GLASSES_OR_BLIND: YES
ADLS_ACUITY_SCORE: 20
CONCENTRATING,_REMEMBERING_OR_MAKING_DECISIONS_DIFFICULTY: NO
ADLS_ACUITY_SCORE: 20
DRESSING/BATHING_DIFFICULTY: NO
WALKING_OR_CLIMBING_STAIRS_DIFFICULTY: NO
DIFFICULTY_EATING/SWALLOWING: NO
ADLS_ACUITY_SCORE: 20
ADLS_ACUITY_SCORE: 20
CHANGE_IN_FUNCTIONAL_STATUS_SINCE_ONSET_OF_CURRENT_ILLNESS/INJURY: NO

## 2023-07-28 ASSESSMENT — LIFESTYLE VARIABLES: TOBACCO_USE: 1

## 2023-07-28 ASSESSMENT — ENCOUNTER SYMPTOMS: SEIZURES: 1

## 2023-07-28 NOTE — ANESTHESIA CARE TRANSFER NOTE
Patient: Yamilet Vazquez    Procedure: Procedure(s):   section       Diagnosis: Fetal intolerance to labor, delivered, current hospitalization [O77.9]  Diagnosis Additional Information: No value filed.    Anesthesia Type:   Epidural     Note:    Oropharynx: oropharynx clear of all foreign objects and spontaneously breathing  Level of Consciousness: awake  Oxygen Supplementation: room air    Independent Airway: airway patency satisfactory and stable  Dentition: dentition unchanged  Vital Signs Stable: post-procedure vital signs reviewed and stable  Report to RN Given: handoff report given  Patient transferred to: Labor and Delivery  Comments: Transferred to OB PACU recovery, spontaneous respirations on room air with oxygen saturations maintained greater than 98%. SpO2, NiBP, and EKG monitors and alarms on and functioning, report on patient's clinical status given to OB recovery RN, RN questions answered, patient in hospital cart with siderails up Oxytocin 30 units in 500mL infusion connected to IV infusion pump in recovery bay and programmed to 100 mL/hr at handoff of care.        Handoff Report: Identifed the Patient, Identified the Reponsible Provider, Reviewed the pertinent medical history, Discussed the surgical course, Reviewed Intra-OP anesthesia mangement and issues during anesthesia, Set expectations for post-procedure period and Allowed opportunity for questions and acknowledgement of understanding      Vitals:  Vitals Value Taken Time   BP     Temp     Pulse     Resp     SpO2         Electronically Signed By: GRANT Villagomez CRNA  2023  4:15 PM

## 2023-07-28 NOTE — PROGRESS NOTES
Data: Patient admitted to room 213 at 0910. Patient is a . Prenatal record reviewed.   OB History    Para Term  AB Living   5 2 2 0 2 2   SAB IAB Ectopic Multiple Live Births   1 1 0 0 2      # Outcome Date GA Lbr Mane/2nd Weight Sex Delivery Anes PTL Lv   5 Current            4 Term 21 38w0d 06:22 / 00:15 2.99 kg (6 lb 9.5 oz) F Vag-Spont EPI N LEIGHTON      Name: LISETTE KAPLAN      Apgar1: 8  Apgar5: 9   3 Term 20 38w3d 01:39 / 01:54 3.05 kg (6 lb 11.6 oz) F Vag-Spont EPI N LEIGHTON      Complications: Preeclampsia/Hypertension      Name: LISETTE KAPLAN      Apgar1: 6  Apgar5: 9   2 SAB 19 4w0d          1 IAB 09 17w0d          .  Medical History:   Past Medical History:   Diagnosis Date    Acute otitis media 2013    ADHD     Anxiety     Asthma     Chronic hypertension     Depressive disorder     takes fluoxetine    History of seizures as a child     Obesity     Psoriasis     Sjogren's syndrome (H)     SS-A antibody positive    .  Gestational age 38w1d. Vital signs per doc flowsheet. Fetal movement present. Patient reports Induction Of Labor (Chronic hypertension)   as reason for admission. Support persons are present.  Action: Verbal consent for EFM, external fetal monitors applied. Admission assessment completed. Patient and support persons educated on labor process. Patient instructed to report change in fetal movement, contractions, vaginal leaking of fluid or bleeding, abdominal pain, or any concerns related to the pregnancy to her nurse/physician. Patient oriented to room, call light in reach.   Response: Dr. Toscano at bedside at admission for SVE, AROM and U/S for position. Plan per provider is AROM, Pitocin, lab work. Patient verbalized understanding of education and verbalized agreement with plan. Continued support given.

## 2023-07-28 NOTE — OP NOTE
[facility]  Obstetrics Operative Report    Surgery Date:  2023  Surgeon(s): Larisa Toscano MD  Assistants:  Denice Crawley MD     Preoperative Diagnoses:  IUP @ 38.1  Chronic hypertension  Fetal intolerance to labor, delivered, current hospitalization [O77.9]   Sjogren's syndrome +anti SSA   Maternal obesity    Postoperative diagnoses: Same     Procedure performed:  Stat primary low segment transverse  section     Anesthesia:  Epidural with duramorph  Est Blood Loss (mL): 250 mL  Fluid replacement:  per anesthesia  Urine output: per anesthesia  Specimens: Cord gases and cord blood to lab  Complications: None apparent    Operative findings:   1. Single viable male infant at 15:08 hours on 23. Apgars of 8 and 9 at one and five minutes.  Birth weight:: 7 lbs 6 oz.  Fetal presentation: LOT. Amniotic fluid: clear. Placenta intact with 3 vessel cord.    2. Normal appearing uterus, fallopian tubes, ovaries.    3. No intraabdominal adhesions.  No abdominal wall adhesions    Indication: Yamilet Vazquez is a 32 year old, , who was admitted at 38w1d for IOL due to CHTN on meds.  Her blood pressures on admission were normal to mildly elevated. Preeclampsia labs were unremarkable (urine protein result elevated, but this was collected after ROM and not done with catheter). She was admitted and underwent IOL with AROM and pitocin. Fetal heart rate tracing was category 1 on admission. She received an epidural without complication as her labor progressed. At 14:44 I was notified of fetal decelerations. I presented to the bedside. Patient had been repositioned and pitocin turned off by nursing staff. FSE in place. FHT 100s with moderate variability, recovered to 140 then back to  within 1 minute. SVE 6/100/-2. Ongoing FHT / bradycardia remote from delivery, so recommendation made to proceed with stat  delivery. Verbal consent obtained.    Procedure details:  The patient was taken  emergently to the operating room. A safety time out was incompletely performed prior to the procedure due to emergent nature. The patient received dosing of her epidural and Duramorph prior to the skin incision. A fallon was already in place. She was placed in the dorsal supine position with a leftward tilt and prepped and draped in the usual sterile fashion rapidly with splash of betadine. Nursing and NICU staff were present and available for baby.     Following test of adequate anesthesia, the abdomen was entered through a Pfannenstiel skin incision. The skin incision was made sharply and carried through the subcutaneous tissue to the fascia with the knife.  Fascia was incised in the midline also with the knife, and extended laterally with manual traction.  The muscle was  in the midline and the peritoneum was entered bluntly and the opening extended by digital dissection.    The lower segment of the uterus was opened sharply in a transverse fashion and extended with digital pressure. The infant was in the vertex LOT presentation. The head was elevated to the level of the hysterotomy and delivered atraumatically. A shoulder cord was noted and infant delivered through this. The remainder of the infant delivered without issue. Fetal scalp electrode was still present on fetal head and with was removed without complication.    Oxytocin was given through the running IV. The cord was doubly clamped after delayed cord clamping of 60 seconds and cut and the infant was handed off to the waiting NICU staff. A segment of the cord was cut and set aside for cord gases. Cord blood was obtained as per protocol.     Dr. Denice Crawley was scrubbed and kindly assisted with stat delivery of this . She was scrubbed and present until after delivery of placenta. She assisted with retraction and visualization.    The placenta was extracted manually.  The uterus was cleared of all clots and debris.  The uterus was massaged  and was noted to be firm.   With vigorous massage as well as administration of oxytocin, good uterine tone was achieved. The hysterotomy was repaired with 0-vicryl suture in a running locked fashion. A 2nd layer of 0-monocryl in a running, non-locked fashion was used to imbricate the incision. A second 0-Monocryl was used for a second imbricating layer as the hysterotomy site included thick myometrium. Areas of oozing made hemostatic with the Bovie as well as focal figure of 8 sutures with 0 vicryl. Good hemostasis was achieved. The bilateral pericolic gutters were cleared of all clots.  The hysterotomy was again inspected and found to have no active sites of bleeding. Surgicel powder was placed over the hysterotomy and bladder flap. Hemostasis excellent.    The fascia and rectus muscles were examined and found to have no active sites of bleeding.  The fascia was closed with a running suture of 0-vicryl.  Subcutaneous tissue was irrigated. Areas that were oozing were controlled with cautery. The subcutaneous tissue was reapproximated with 3-0 vicryl suture. The skin was closed with 4-0 Monocryl. Steri strips and an island dressing were placed.    The patient tolerated the procedure well overall. She had some nausea and anxiety that was managed by anesthesia. She was taken to the recovery room in stable condition. An X-ray was performed as counts had not been completed prior to the start of the procedure.     X ray result:                                                            IMPRESSION: No radiopaque foreign body demonstrated aside from the  epidural catheter.     Cord gases:    pH Cord Blood Arterial 7.16 - 7.39 7.33    pCO2 Cord Blood Arterial 35 - 71 mm Hg 52    pO2 Cord Blood Arterial 3 - 33 mm Hg 16    Bicarbonate Cord Blood Arterial 16 - 24 mmol/L 27 High     Base Excess Cord Arterial -9.6 - 2.0 mmol/L 0.1       Larisa Toscano MD   [facility]  7/28/2023 4:01 PM

## 2023-07-28 NOTE — H&P
"OB Brief Admit H&P    No significant change in general health status based on examination of the patient, review of Nursing Admission Database and prenatal record.    Pt is a 32 year old  @ 38w1d who presented to L&D for IOL due to CHTN.    Patient's prenatal course has been complicated by:  1. Anxiety & depression - on fluoxetine  2. ADHD - declined stopping Vyvanse in pregnancy  3. Sjogren's syndrome - +anti SSA; -MFM consult/level II (WNL). Last preg needed fetal cardiac monitoring q o wk MFM or peds card, growth US q 4, BPPs at 32, post ursula EKG, notify peds at delivery.  4. -[place] intervals (stopped 2023, [facility] no longer recommends)  5. -recommended 2 echo during pregnancy (declines)  6. RhD negative - Rhogam at 27 weeks  7. Abnl pap - NILOM+HPV other 2022; colposcopy- unsatisfactory, rpt pap smear post partum  8. CHTN - labetalol 200 mg QHS. Baseline labs done. ASA at 12 wks. Plan serial growth US's,BPPs at 32 wks, delivery in 38th week    Prenatal Labs:    Blood type O Negative  Rubella immune     GBS negative 23    EFW: 7 lbs    Vitals:    23 0939 23 1005 23 1100   BP:  126/83 134/86   Resp:  18 18   Temp:  96.8  F (36  C) 97.6  F (36.4  C)   TempSrc:  Temporal Temporal   Weight: 93.4 kg (206 lb)     Height: 1.549 m (5' 1\")       EFM:  135, mod miguel angel ++accel, no decels (cat 1)  Uvalda: Q2-5  SVE: 2/50%/-3  Membranes:  AROM, clear this check    Bedside US performed prior to AROM confirming vertex.    Assessment:  32 year old  @ 38w1d admitted for IOL due to CHTN.    Plan:  1. Admit to labor and delivery   2. IOL  - AROM performed  - Plan pitocin unless adequate ctx within 1 hour  - Ok for epidural  - Anticipate   3. CHTN  - Lab on admission  - No s/stx preE at this time, continue to monitor closely.  4. Sjogren's +SSA ab  - Notify peds at delivery, plan NICU at delivery as well due to maternal use of fluoxetine and Vyvanse  5. S/p Tdap    Larisa Toscano, " MD  7/28/2023  9:23 AM

## 2023-07-28 NOTE — ANESTHESIA PREPROCEDURE EVALUATION
Anesthesia Pre-Procedure Evaluation    Patient: Yamilet Vazquez   MRN: [id number] : 1990        Procedure :           Past Medical History:   Diagnosis Date    Acute otitis media 2013    ADHD     Anxiety     Asthma     Chronic hypertension     Depressive disorder     takes fluoxetine    History of seizures as a child     Obesity     Psoriasis     Sjogren's syndrome (H)     SS-A antibody positive       Past Surgical History:   Procedure Laterality Date    AS INDUCED ABORTN BY DIL/EVAC      TONSILLECTOMY, ADENOIDECTOMY, MYRINGOTOMY, INSERT TUBE BILATERAL, COMBINED        No Known Allergies   Social History     Tobacco Use    Smoking status: Former     Packs/day: 0.25     Types: Cigarettes     Quit date: 2018     Years since quittin.6     Passive exposure: Never    Smokeless tobacco: Never   Substance Use Topics    Alcohol use: Not Currently      Wt Readings from Last 1 Encounters:   23 93.4 kg (206 lb)        Anesthesia Evaluation   Pt has had prior anesthetic. Type: General.    No history of anesthetic complications       ROS/MED HX  ENT/Pulmonary: Comment: Sjogren's    (+)                tobacco use (Quit in ), Past use,    asthma                  Neurologic:     (+)       seizures, last seizure: childhood,                        Cardiovascular:     (+)  hypertension- -   -  - -                                      METS/Exercise Tolerance:     Hematologic:       Musculoskeletal: Comment: Psoriasis      GI/Hepatic:       Renal/Genitourinary:       Endo:     (+)               Obesity,       Psychiatric/Substance Use:     (+) psychiatric history anxiety, depression and other (comment) (ADD)       Infectious Disease:       Malignancy:       Other:            Physical Exam    Airway        Mallampati: II   TM distance: > 3 FB   Neck ROM: full   Mouth opening: > 3 cm    Respiratory Devices and Support         Dental  no notable dental history         Cardiovascular    cardiovascular exam normal          Pulmonary   pulmonary exam normal                OUTSIDE LABS:  CBC:   Lab Results   Component Value Date    WBC 5.5 07/28/2023    WBC 6.4 01/16/2023    HGB 12.4 07/28/2023    HGB 12.4 05/15/2023    HCT 36.0 07/28/2023    HCT 40.3 01/16/2023     07/28/2023     01/16/2023     BMP:   Lab Results   Component Value Date     (L) 07/08/2023     07/07/2021    POTASSIUM 3.5 07/08/2023    POTASSIUM 3.5 07/07/2021    CHLORIDE 103 07/08/2023    CHLORIDE 106 07/07/2021    CO2 21 (L) 07/08/2023    CO2 24 07/07/2021    BUN 5.0 (L) 07/08/2023    BUN 7 07/07/2021    CR 0.57 07/28/2023    CR 0.46 (L) 07/08/2023    GLC 85 07/08/2023    GLC 81 07/07/2021     COAGS: No results found for: PTT, INR, FIBR  POC:   Lab Results   Component Value Date    HCG Negative 07/11/2014     HEPATIC:   Lab Results   Component Value Date    ALBUMIN 3.4 (L) 07/08/2023    PROTTOTAL 6.4 07/08/2023    ALT 10 07/28/2023    AST 23 07/28/2023    ALKPHOS 76 07/08/2023    BILITOTAL 0.3 07/08/2023     OTHER:   Lab Results   Component Value Date    GAVIN 8.9 07/08/2023    TSH 1.37 10/05/2005       Anesthesia Plan    ASA Status:  2       Anesthesia Type: Epidural.              Consents    Anesthesia Plan(s) and associated risks, benefits, and realistic alternatives discussed. Questions answered and patient/representative(s) expressed understanding.     - Discussed:     - Discussed with:  Patient            Postoperative Care            Comments:                Carson Neves MD

## 2023-07-28 NOTE — BRIEF OP NOTE
[facility] Brief Operative Note    Pre-operative diagnosis: IUP @ 38.1  Chronic hypertension  Fetal intolerance to labor, delivered, current hospitalization [O77.9]  Sjogren's syndrome - +anti SSA   Maternal obesity     Post-operative diagnosis Same   Procedure: Procedure(s):   section   Surgeon(s): Surgeon(s) and Role:     * Larisa Toscano MD - Primary  Assistant - Denice Crawley MD (incision to delivery of placenta)   Estimated blood loss: 250 mL    Specimens: Cord gases & cord blood to lab   Findings: Vigorous viable male infant, weight pending, Apgars 8 & 9  Normal female PP anatomy    Larisa Toscano MD

## 2023-07-28 NOTE — PROGRESS NOTES
Late entry -    Called to beside for declarations. Patient had been repositioned and pitocin turned off by nursing staff. FSE in placed. FHT 100s with moderate variability, recovered to 140 then back to  within 1 minute. SVE 6/100/-2. Ongoing FHT / bradycardia remote from delivery, so recommendation made to proceed with stat  delivery. Verbal consent obtained.    Larisa Toscano MD

## 2023-07-28 NOTE — ANESTHESIA PROCEDURE NOTES
Epidural catheter Procedure Note    Pre-Procedure   Staff -        Anesthesiologist:  Carson Neves MD       Performed By: anesthesiologist       Location: OB       Pre-Anesthestic Checklist: patient identified, IV checked, site marked, risks and benefits discussed, informed consent, monitors and equipment checked, pre-op evaluation and at physician/surgeon's request  Timeout:       Correct Patient: Yes        Correct Procedure: Yes        Correct Site: Yes        Correct Position: Yes   Procedure Documentation  Procedure: epidural catheter       Patient Position: sitting       Skin prep: Betadine       Local skin infiltrated with 1 mL of 1% lidocaine.        Insertion Site: L3-4. (midline approach).       Technique: LORT saline and LORT air        Needle Type: Touhy needle       Needle Gauge: 17.        Needle Length (Inches): 3.5        Catheter: 19 G.          Catheter threaded easily.             # of attempts: 1 and  # of redirects:     Assessment/Narrative         Paresthesias: No.       Test dose of 3 mL lidocaine 1.5% w/ 1:200,000 epinephrine at.         Test dose negative, 3 minutes after injection, for signs of intravascular, subdural, or intrathecal injection.       Insertion/Infusion Method: LORT saline and LORT air       Aspiration negative for Heme or CSF via Epidural Catheter.     Comments:  Pre-procedure time out completed. Patient in sitting position, the lumbar spine was prepped and draped in sterile fashion. The L3/L4 interspace was identified and local anesthetic was injected for local skin infiltration. A 17 G touhy needle was advanced to the epidural space which was confirmed with the loss of resistance technique at 5 cm. A catheter was then advanced easily into the epidural space. The catheter was left at 9 cm at the skin. Negative aspiration of blood and CSF was confirmed. A test dose of 1.5% lidocaine with 1:200,000 epinephrine was injected through the catheter and was negative  "for intravascular injection. The site was covered with sterile tegaderm and the catheter was secured with tape.    Orders to manage the epidural infusion have been entered and, through coordination with the nurse, we will continue to manage and monitor the patient's labor epidural.  We will continuously be available to adjust as needed throughout the entire labor and delivery process.      FOR [facility] ([facility]) ONLY:   Pain Team Contact information: please page the Pain Team Via [employer]. Search \"Pain\". During daytime hours, please page the attending first. At night please page the resident first.      "

## 2023-07-29 LAB — HGB BLD-MCNC: 10.2 G/DL (ref 11.7–15.7)

## 2023-07-29 PROCEDURE — 250N000011 HC RX IP 250 OP 636: Mod: JZ | Performed by: OBSTETRICS & GYNECOLOGY

## 2023-07-29 PROCEDURE — 36415 COLL VENOUS BLD VENIPUNCTURE: CPT | Performed by: OBSTETRICS & GYNECOLOGY

## 2023-07-29 PROCEDURE — 85018 HEMOGLOBIN: CPT | Performed by: OBSTETRICS & GYNECOLOGY

## 2023-07-29 PROCEDURE — 120N000012 HC R&B POSTPARTUM

## 2023-07-29 PROCEDURE — 250N000013 HC RX MED GY IP 250 OP 250 PS 637: Performed by: OBSTETRICS & GYNECOLOGY

## 2023-07-29 RX ADMIN — IBUPROFEN 800 MG: 400 TABLET ORAL at 16:16

## 2023-07-29 RX ADMIN — SENNOSIDES AND DOCUSATE SODIUM 1 TABLET: 50; 8.6 TABLET ORAL at 08:06

## 2023-07-29 RX ADMIN — ACETAMINOPHEN 975 MG: 325 TABLET, FILM COATED ORAL at 18:13

## 2023-07-29 RX ADMIN — ACETAMINOPHEN 975 MG: 325 TABLET, FILM COATED ORAL at 11:47

## 2023-07-29 RX ADMIN — KETOROLAC TROMETHAMINE 30 MG: 30 INJECTION, SOLUTION INTRAMUSCULAR; INTRAVENOUS at 10:19

## 2023-07-29 RX ADMIN — PRENATAL VITAMINS-IRON FUMARATE 27 MG IRON-FOLIC ACID 0.8 MG TABLET 1 TABLET: at 20:01

## 2023-07-29 RX ADMIN — LABETALOL HYDROCHLORIDE 200 MG: 200 TABLET, FILM COATED ORAL at 20:01

## 2023-07-29 RX ADMIN — FLUOXETINE 20 MG: 20 CAPSULE ORAL at 20:01

## 2023-07-29 RX ADMIN — OXYCODONE HYDROCHLORIDE 5 MG: 5 TABLET ORAL at 18:13

## 2023-07-29 RX ADMIN — LABETALOL HYDROCHLORIDE 200 MG: 200 TABLET, FILM COATED ORAL at 08:06

## 2023-07-29 RX ADMIN — OXYCODONE HYDROCHLORIDE 5 MG: 5 TABLET ORAL at 13:36

## 2023-07-29 RX ADMIN — OXYCODONE HYDROCHLORIDE 5 MG: 5 TABLET ORAL at 08:15

## 2023-07-29 RX ADMIN — KETOROLAC TROMETHAMINE 30 MG: 30 INJECTION, SOLUTION INTRAMUSCULAR; INTRAVENOUS at 04:25

## 2023-07-29 RX ADMIN — ACETAMINOPHEN 975 MG: 325 TABLET, FILM COATED ORAL at 05:57

## 2023-07-29 RX ADMIN — SENNOSIDES AND DOCUSATE SODIUM 1 TABLET: 50; 8.6 TABLET ORAL at 20:01

## 2023-07-29 ASSESSMENT — ACTIVITIES OF DAILY LIVING (ADL)
ADLS_ACUITY_SCORE: 21
ADLS_ACUITY_SCORE: 20
ADLS_ACUITY_SCORE: 21
ADLS_ACUITY_SCORE: 20
ADLS_ACUITY_SCORE: 21
ADLS_ACUITY_SCORE: 20
ADLS_ACUITY_SCORE: 23

## 2023-07-29 NOTE — PROGRESS NOTES
Pt admitted from L&D via bed. Pt arrived with baby and was accompanied by  and arrived with personal belongings. Report was taken from CONCHIS Broussard in L&D.  Pt is A&Ox3 and VSS on RA. Fundus is firm and midline.  Vaginal bleeding is scant. PIV patent and infusing.  Coleman patent and draining.  Dressing to lower abdomen CDI.  Pneumoboots in place to BLE.  Pt. oriented to the room and call light system.

## 2023-07-29 NOTE — LACTATION NOTE
Offered a lactation visit to Carlton, she very sweetly declined. This is Carlton's third baby and she shares breastfeeding is going well.    Avani Velasco RN IBCLC

## 2023-07-29 NOTE — PLAN OF CARE
Goal Outcome Evaluation:  Pt delivered via stat C/section for fetal distress. See flow sheets and delivery summary. Transferred to room 425 on cart. Bands verified. Bedside report to CONCHIS Baig. Care taken over.

## 2023-07-29 NOTE — PROGRESS NOTES
"1415 Pt repositioned to left lateral side. Discussed POC. Support given. Back to supine position for fallon placement. Pt naseated and tilted to left side. Fallon placed after pt feeling better. Denies pain or discomfort. SVE 5cm/80%/soft. Positioned to left side. Peanut ball in place. Monitors adjusted. Difficulty obtaining fetal tracing.   1430 Repositioned to her right side. FHT's 110. Pt in trendelenberg.  1435 pitocin turned off. IVF bolus given. Attempt to place in hands and knees unsuccessful.  1140- charge nurse at bedside. To review strip. Page to Dr. Toscano  1145-pt assisted to knee chest. Everton returns page. Will come assess remains difficult to obtain FHT's  1450- to right side. FSE placed by this RN. Pt remains 5-6 cm. No bleeding noted.   1452- Dr. Toscano at bedside. Reviews strip. SVE. Discussed with pt and spouse FHT\"s and need for emergent .  1458- FHT's 105 with decels 60-'s. Remains difficult to monitor UC. To OR in bed. Continued support to patient and spouse. See flow sheets and delivery summary.  "

## 2023-07-29 NOTE — ANESTHESIA POSTPROCEDURE EVALUATION
Patient: Yamilet Vazquez    Procedure: Procedure(s):   section       Anesthesia Type:  Epidural    Note:     Postop Pain Control: Uneventful            Sign Out: Well controlled pain   PONV:    Neuro/Psych: Uneventful            Sign Out: Acceptable/Baseline neuro status   Airway/Respiratory: Uneventful            Sign Out: Acceptable/Baseline resp. status   CV/Hemodynamics: Uneventful            Sign Out: Acceptable CV status; No obvious hypovolemia; No obvious fluid overload   Other NRE:    DID A NON-ROUTINE EVENT OCCUR?            Last vitals:  Vitals Value Taken Time   /83 23   Temp 36.4  C (97.6  F) 23   Pulse 78 23   Resp 16 23   SpO2 99 % 23       Electronically Signed By: August Rivera MD  2023  7:53 PM

## 2023-07-29 NOTE — PLAN OF CARE
Vital signs stable. Postpartum assessment WDL. Incision with dressing CDI. Pain controlled with Tylenol, Toradol, and Oxycodone. PIV SL- AM hgb scheduled. Patient ambulating with SBA. Coleman removed at 0100- post void of 200ml at 0430. Patient reports passing gas. Pt plans to work on breastfeeding today. Pumping overnight and feeding SIM via bottle per pt request. Patient and infant bonding well. Will continue with current plan of care.

## 2023-07-29 NOTE — OR NURSING
Placenta discarded per MD order  Debrief- QBL reviewed and discussed. Decision made for 250mL EBL.

## 2023-07-29 NOTE — PROGRESS NOTES
"Gyn Post-operative Note POD# 1    S:  Patient doing well.  Pain well controlled on Duramorph, Toradol, prn Dilaudid and Tylenol pain medication.  Ambulating.  Tolerating regular diet.  Coleman out.  Voiding.  Passing flatus.      O:   /77 (BP Location: Right arm)   Pulse 64   Temp 98.2  F (36.8  C) (Oral)   Resp 16   Ht 1.549 m (5' 1\")   Wt 93.4 kg (206 lb)   LMP 10/30/2022   SpO2 100%   Breastfeeding Unknown   BMI 38.92 kg/m     No intake/output data recorded.  Vitals:    07/28/23 2228 07/28/23 2335 07/29/23 0036 07/29/23 0427   BP: 122/74 115/71 124/73 121/77   BP Location: Right arm Right arm Right arm Right arm   Patient Position:       Cuff Size:       Pulse: 67 62 60 64   Resp: 16 16 16 16   Temp: 97.8  F (36.6  C) 98.2  F (36.8  C) 97.7  F (36.5  C) 98.2  F (36.8  C)   TempSrc: Axillary Oral Oral Oral   SpO2: 98% 99% 99% 100%   Weight:       Height:         UOP 1,600 past 24 hours      Gen- A&O, NAD  Abd- soft, non-tender, non-distended, no guarding or rebound  Incision(s)- C/D/I (bandage in place)  Ext- Non-tender, trace edema    Labs:    Hemoglobin   Date Value Ref Range Status   07/28/2023 12.4 11.7 - 15.7 g/dL Final   07/08/2021 9.6 (L) 11.7 - 15.7 g/dL Final       A/P:  32 year old POD# 1 s/p stat PLTCS for non reassuring fetal status, IOL for CHTN.    1.  Routine post-op cares  -- Regular diet  -- Ambulate  -- Pain control measures  2.  Heme: , pre Hgb 12.4, post pending.  3. Psych:   -- Anxiety & depression - on fluoxetine  -- ADHD - continue Vyvanse per patient preference  4.  RhD negative - baby O neg, no need for Rhogam.  5. CHTN - labetalol 200 mg BID. Mildly elevated pressures immediately post delivery, now normal. No other s/stx preeclampsia. Pt was on metoprolol prior to conception, will transition as outpatient.  6.  Anticipate d/c home on POD# 3    Larisa Toscano MD  7/29/2023  8:08 AM   "

## 2023-07-29 NOTE — PLAN OF CARE
Goal Outcome Evaluation:      Plan of Care Reviewed With: patient, spouse    Overall Patient Progress: improvingOverall Patient Progress: improving       Patient states incisional pain controlled well with po meds. Tolerating general diet. Voiding without difficulty and encouraged frequent voiding. Up ambulating in the room and encouraged ambulation in the halls. Patient up and showered today. Independent with cares but encouraged patient to call for assistance as needed. Patient verbalized understanding.

## 2023-07-30 VITALS
BODY MASS INDEX: 38.89 KG/M2 | WEIGHT: 206 LBS | DIASTOLIC BLOOD PRESSURE: 85 MMHG | TEMPERATURE: 98.2 F | OXYGEN SATURATION: 100 % | RESPIRATION RATE: 18 BRPM | HEIGHT: 61 IN | HEART RATE: 75 BPM | SYSTOLIC BLOOD PRESSURE: 119 MMHG

## 2023-07-30 PROBLEM — I10 CHRONIC HYPERTENSION: Status: ACTIVE | Noted: 2023-07-30

## 2023-07-30 PROBLEM — F32.A DEPRESSIVE DISORDER: Status: ACTIVE | Noted: 2023-07-30

## 2023-07-30 PROBLEM — F90.9 ADHD: Status: ACTIVE | Noted: 2023-07-30

## 2023-07-30 PROBLEM — Z98.891 S/P C-SECTION: Status: ACTIVE | Noted: 2023-07-30

## 2023-07-30 PROBLEM — F41.9 ANXIETY: Status: ACTIVE | Noted: 2023-07-30

## 2023-07-30 PROCEDURE — 250N000013 HC RX MED GY IP 250 OP 250 PS 637: Performed by: OBSTETRICS & GYNECOLOGY

## 2023-07-30 RX ORDER — OXYCODONE HYDROCHLORIDE 5 MG/1
5 TABLET ORAL EVERY 4 HOURS PRN
Qty: 15 TABLET | Refills: 0 | Status: SHIPPED | OUTPATIENT
Start: 2023-07-30

## 2023-07-30 RX ORDER — AMOXICILLIN 250 MG
1 CAPSULE ORAL 2 TIMES DAILY
COMMUNITY
Start: 2023-07-30

## 2023-07-30 RX ORDER — DIPHENHYDRAMINE HCL 25 MG
25 CAPSULE ORAL EVERY 6 HOURS PRN
Status: DISCONTINUED | OUTPATIENT
Start: 2023-07-30 | End: 2023-07-30 | Stop reason: HOSPADM

## 2023-07-30 RX ORDER — ACETAMINOPHEN 325 MG/1
975 TABLET ORAL EVERY 6 HOURS
COMMUNITY
Start: 2023-07-30

## 2023-07-30 RX ORDER — IBUPROFEN 800 MG/1
800 TABLET, FILM COATED ORAL EVERY 6 HOURS
COMMUNITY
Start: 2023-07-30

## 2023-07-30 RX ADMIN — ACETAMINOPHEN 975 MG: 325 TABLET, FILM COATED ORAL at 12:25

## 2023-07-30 RX ADMIN — ACETAMINOPHEN 975 MG: 325 TABLET, FILM COATED ORAL at 06:12

## 2023-07-30 RX ADMIN — ACETAMINOPHEN 975 MG: 325 TABLET, FILM COATED ORAL at 00:30

## 2023-07-30 RX ADMIN — LABETALOL HYDROCHLORIDE 200 MG: 200 TABLET, FILM COATED ORAL at 08:51

## 2023-07-30 RX ADMIN — IBUPROFEN 800 MG: 400 TABLET ORAL at 12:25

## 2023-07-30 RX ADMIN — IBUPROFEN 800 MG: 400 TABLET ORAL at 00:30

## 2023-07-30 RX ADMIN — IBUPROFEN 800 MG: 400 TABLET ORAL at 06:14

## 2023-07-30 RX ADMIN — DIPHENHYDRAMINE HYDROCHLORIDE 25 MG: 25 CAPSULE ORAL at 10:48

## 2023-07-30 RX ADMIN — HYDROCORTISONE: 25 CREAM TOPICAL at 09:43

## 2023-07-30 RX ADMIN — OXYCODONE HYDROCHLORIDE 5 MG: 5 TABLET ORAL at 10:54

## 2023-07-30 ASSESSMENT — ACTIVITIES OF DAILY LIVING (ADL)
ADLS_ACUITY_SCORE: 20

## 2023-07-30 NOTE — DISCHARGE INSTRUCTIONS
Contact your doctor if you have a temperature >100.4F, if you experience heavy bleeding and are soaking through a pad in less than one hour, if you are not able to eat or drink, or have severe abdominal pain.     Nothing in the vagina for 6 weeks post partum. No intercourse, tampons or douching.   Showers are okay. No soaking in a bath, sitz baths for 15-20 min 2x daily are okay.     Please make a follow up appointment in the office in 1 weeks.       Please check your blood pressure at least 2 times per day. If over 150 on the top number  on the bottom number, please recheck in 15 minutes and if still elevated, please call the office.   Please call the office with chest pain with shortness of breath or blurry vision with a headache, or upper abdominal pain as this could be concerning for preeclampsia (blood pressure problems with pregnancy and post partum).       No heavy lifting over 20 pounds for 6 weeks   If you have increase in drainage or signs of infection (redness, warmth), please be seen.     Postop  Birth Instructions    Activity     Do not lift more than 20 pounds for 6 weeks after surgery.  Ask family and friends for help when you need it.  No driving until you have stopped taking your pain medications (usually two weeks after surgery).  No heavy exercise or activity for 6 weeks.  Don't do anything that will put a strain on your surgery site.  Don't strain when using the toilet.  Your care team may prescribe a stool softener if you have problems with your bowel movements.     To care for your incision:     Keep the incision clean and dry.  Do not soak your incision in water. No swimming or hot tubs until it has fully healed. You may soak in the bathtub if the water level is below your incision.  Do not use peroxide, gel, cream, lotion, or ointment on your incision.  Adjust your clothes to avoid pressure on your surgery site (check the elastic in your underwear for example).     You may see a  small amount of clear or pink drainage and this is normal.  Check with your health care provider:     If the drainage increases or has an odor.  If the incision reddens, you have swelling, or develop a rash.  If you have increased pain and the medicine we prescribed doesn't help.  If you have a fever above 100.4 F (38 C) with or without chills when placing thermometer under your tongue.   The area around your incision (surgery wound), will feel numb.  This is normal. The numbness should go away in less than a year.     Keep your hands clean:  Always wash your hands before touching your incision (surgery wound). This helps reduce your risk of infection. If your hands aren't dirty, you may use an alcohol hand-rub to clean your hands. Keep your nails clean and short.    Call your healthcare provider if you have any of these symptoms:     You soak a sanitary pad with blood within 1 hour, or you see blood clots larger than a golf ball.  Bleeding that lasts more than 6 weeks.  Vaginal discharge that smells bad.  Severe pain, cramping or tenderness in your lower belly area.  A need to urinate more frequently (use the toilet more often), more urgently (use the toilet very quickly), or it burns when you urinate.  Nausea and vomiting.  Redness, swelling or pain around a vein in your leg.  Problems breastfeeding or a red or painful area on your breast.  Chest pain and cough or are gasping for air.  Problems with coping with sadness, anxiety or depression. If you have concerns about hurting yourself or the baby, call your provider immediately.    You have questions or concerns after you return home.       Warning Signs after Having a Baby    Keep this paper on your fridge or somewhere else where you can see it.    Call your provider if you have any of these symptoms up to 12 weeks after having your baby.    Thoughts of hurting yourself or your baby  Pain in your chest or trouble breathing  Severe headache not helped by pain  medicine  Eyesight concerns (blurry vision, seeing spots or flashes of light, other changes to eyesight)  Fainting, shaking or other signs of a seizure    Call 9-1-1 if you feel that it is an emergency.     The symptoms below can happen to anyone after giving birth. They can be very serious. Call your provider if you have any of these warning signs.    My provider s phone number: _______________________    Losing too much blood (hemorrhage)    Call your provider if you soak through a pad in less than an hour or pass blood clots bigger than a golf ball. These may be signs that you are bleeding too much.    Blood clots in the legs or lungs    After you give birth, your body naturally clots its blood to help prevent blood loss. Sometimes this increased clotting can happen in other areas of the body, like the legs or lungs. This can block your blood flow and be very dangerous.     Call your provider if you:  Have a red, swollen spot on the back of your leg that is warm or painful when you touch it.   Are coughing up blood.     Infection    Call your provider if you have any of these symptoms:  Fever of 100.4 F (38 C) or higher.  Pain or redness around your stitches if you had an incision.   Any yellow, white, or green fluid coming from places where you had stitches or surgery.    Mood Problems (postpartum depression)    Many people feel sad or have mood changes after having a baby. But for some people, these mood swings are worse.     Call your provider right away if you feel so anxious or nervous that you can't care for yourself or your baby.    Preeclampsia (high blood pressure)    Even if you didn't have high blood pressure when you were pregnant, you are at risk for the high blood pressure disease called preeclampsia. This risk can last up to 12 weeks after giving birth.     Call your provider if you have:   Pain on your right side under your rib cage  Sudden swelling in the hands and face    Remember: You know your  body. If something doesn't feel right, get medical help.     For informational purposes only. Not to replace the advice of your health care provider. Copyright 2020 [employer] [facility]. All rights reserved. Clinically reviewed by Anita Ta RNC-OB, MSN. SecureAlert [id number] - Rev 02/23.

## 2023-07-30 NOTE — PLAN OF CARE
Vital signs stable. Postpartum assessment WDL. Incision with steri-strips CDI. Pain controlled with Tylenol and Ibuprofen. Patient ambulating independently. Patient reports passing gas. Bottle feeding  SIM via bottle. Patient and infant bonding well. Will continue with current plan of care.

## 2023-07-30 NOTE — PROGRESS NOTES
Post-partum Note      S: Patient is doing well today.  Pain is controlled with PO medications.  Tolerating regular diet without nausea or vomiting.  Ambulating without dizziness.  Urinating without difficulty. Lochia normal.  Bottle feeding. Mood good. Wqould like to go home today     O:  Patient Vitals for the past 24 hrs:   BP Temp Temp src Pulse Resp SpO2   23 0811 119/85 98.2  F (36.8  C) Oral 75 18 --   23 0032 110/70 97.4  F (36.3  C) Axillary 86 16 --   23 106/63 -- -- -- -- --   23 1616 108/73 -- -- -- 16 --   23 1147 111/73 97.7  F (36.5  C) Oral -- 18 100 %       Gen:  Resting comfortably, NAD  Pulm:  Breathing comfortably on room air  Abd:  Soft, appropriately ttp, non-distended.Fundus at umbilicus, firm and non-tender.  Incision: c/d/I with steri strips   Ext:  non-tender, 1+ bilateral LE edema    I/O last 3 completed shifts:  In: -   Out: 200 [Urine:200]    Hgb:     Hemoglobin   Date Value Ref Range Status   2023 10.2 (L) 11.7 - 15.7 g/dL Final   2023 12.4 11.7 - 15.7 g/dL Final   2021 9.6 (L) 11.7 - 15.7 g/dL Final   2021 11.8 11.7 - 15.7 g/dL Final       Assessment/Plan:  32 year old  on POD2 s/p PLTCS for category 2 after IOL for cHTN at 38w1d  Anxiety & depression - on fluoxetine  ADHD - declined stopping Vyvanse in pregnancy  Sjogren's syndrome - +anti SSA; -MFM consult/level II (WNL). Last preg needed fetal cardiac monitoring q o wk MFM or peds card, growth US q 4, BPPs at 32, post ursula EKG, notify peds at delivery.-[place] intervals (stopped 2023, [facility] no longer recommends) -recommended 2 echo during pregnancy (declines)  RhD negative - Rhogam at 27 weeks  Abnl pap - NILOM+HPV other 2022; colposcopy- unsatisfactory, rpt pap smear post partum  CHTN - labetalol 200 mg QHS. Baseline labs done. ASA at 12 wks. Plan serial growth US's,BPPs at 32 wks, delivery in 38th week    1. Continue with routine postpartum management  2. Incision is  c/d/I with sutures and steri strips   3. EBL: 145 ml ; pre hemoglobin 12.4, post hemogobin 10.2, no symptoms of anemia.   4.   Lab Results   Component Value Date    ABO O 07/08/2021    RH Neg 07/08/2021    , Rubella immune  5. Feed: bottle feeding   6. CV/RESP: cHTN: on labetalol mg BID, bp normal. Also metoprolol at home, will hold until bp increases   7. DVT PPX: ambulation   8. A/d/adhd: doing well on meds.     Dispo: Anticipate DC home today. Warning signs reviewed. Follow-up in 1 weeks.    MD Torsten Grijalva OB/[facility]  7/30/2023, 9:09 AM

## 2023-07-30 NOTE — PLAN OF CARE
Goal Outcome Evaluation:      Plan of Care Reviewed With: patient, spouse    Overall Patient Progress: improvingOverall Patient Progress: improving       D: VSS, assessments WDL.   I: Pt. received complete discharge paperwork and home medications as filled by discharge pharmacy and to continue over the counter.  Pt. was given times of last dose for all discharge medications in writing on discharge medication sheets.  Discharge teaching included home medication, pain management, activity restrictions, postpartum cares, and signs and symptoms of infection.    A: Discharge outcomes on care plan met. Patient states understanding and comfort with self care and follow up care.   P: Pt. Discharged.  Pt. was accompanied by  and baby and left with personal belongings.  Home care ordered.  Pt. to follow up with OB provider per discharge instructions.  Pt. had no further questions at the time of discharge and no unmet needs were identified.

## 2023-07-30 NOTE — ANESTHESIA POSTPROCEDURE EVALUATION
Patient: Yamilet Vazquez    Procedure: Procedure(s):   section       Anesthesia Type:  Epidural    Note:  Anesthesia Post Evaluation    Last vitals:  Vitals:    23   BP:      Resp:      Temp:      SpO2: 97% 97% 96%       Electronically Signed By: Benjamin Guthrie MD  2023  5:37 PM

## 2023-08-01 NOTE — DISCHARGE SUMMARY
OB  Discharge Summary    DOA: 2023  DOD:  2023    Admission Diagnosis  1.  IUP @ 38w1d   2.  Chronic hypertension  3.  Anxiety, depression, ADHD  4.  Sjogren's syndrome    Discharge Diagnosis  1.  IUP @ 38w1d   2.  Chronic hypertension  3.  Anxiety, depression, ADHD  4.  Sjogren's syndrome  5.  POD# 2 s/p STAT PTLCS For fetal bradycardia after IOL for CHTN       HPI / Hospital Course:     Patient is a 32 year old  38w1d who presented to L&D for IOL due to CHTN.  Her prenatal course was remarkable for:  1. Anxiety & depression - on fluoxetine  2. ADHD - declined stopping Vyvanse in pregnancy  3. Sjogren's syndrome - +anti SSA; -MFM consult/level II (WNL). Last preg needed fetal cardiac monitoring q o wk MFM or peds card, growth US q 4, BPPs at 32, post ursula EKG, notify peds at delivery; recommended echo during pregnancy (declines)  4. RhD negative - Rhogam at 27 weeks  5. Abnl pap - NILOM+HPV other 2022; colposcopy- unsatisfactory, rpt pap smear post partum  6. CHTN - labetalol 200 mg QHS. Baseline labs done. ASA at 12 wks. Plan serial growth US's,BPPs at 32 wks, delivery in 38th week.    Patient admitted to the hospital with category 1 tracing.  AROM and pitocin utilized for IOL. PreE labs were normal on admission, and patient's blood pressure was normal. She received an epidural without complication. 1-2 hours later, fetal heart rate tracing became concerning for recurrent late and prolonged decelerations. These did not resolve with position change, stopping pitocin. I presented to evaluate the patient. She was 5 cm dilated. Due to concerning fetal heart rate changes remote from delivery, I recommended we proceed with a stat  delivery.     Intra-operative course:  Patient was taken to the operating room for the above noted procedure. It was completed under epidural anesthesia. No complications.  For full details of intraoperative course, please see dictated operative note.       Post-operative course:   Patient's post-operative course was uncomplicated.  By post-operative day 2 she was ambulating, her pain was well-controlled on oral pain medications, her bleeding was minimal, she was tolerating PO, she was voiding and passing flatus. She was maintained on labetalol 200 mg BID for bp control.  Patient was deemed stable for discharge.  Her post-op Hgb was   Lab Results   Component Value Date    HGB 10.2 07/29/2023    HGB 12.4 07/28/2023    HGB 9.6 07/08/2021    HGB 11.8 07/07/2021    On the day of discharge, her wound was without evidence of infection.      Post-op instructions:  1.  Patient was instructed to RTC in 1 week for a blood pressure check and in  6 weeks for post-partum visit.  2.  Patient was instructed to call MD for temperature greater than 100.4, foul smelling vaginal discharge, bleeding >1pad per hour, severe pain not controlled by pain medications, severe HA, redness/drainage from incision, asymmetric LE edema or with other concerns.  3.  Post-partum mood symptoms discussed.  Pt will call with si/sx of depression.  4.  Patient instructed to avoid lifting >20# x 6 weeks, to avoid vigorous exercise x 6 weeks, to observe pelvic rest x 6 weeks (no tampons or IC) and to avoid driving while taking narcotics.  5.  Patient instructed to remove steri-strips after 1 week.       Post-op medications:  1.  Ibuprofen 600mg PO Q6hrs PRN pain  2.  Oxycodone 5mg PO Q4hrs PRN severe pain  3.  Colace or other stool softener as needed  4.  PNV  5.  Resume home medications    Larisa Toscano MD  8/1/2023  9:58 AM

## 2025-07-27 ENCOUNTER — HEALTH MAINTENANCE LETTER (OUTPATIENT)
Age: 35
End: 2025-07-27

## (undated) DEVICE — DECANTER BAG 2002S

## (undated) DEVICE — CATH TRAY FOLEY 16FR BARDEX W/DRAIN BAG STATLOCK 300316A

## (undated) DEVICE — SURGICEL POWDER ABSORBABLE HEMOSTAT 3GM 3013SP

## (undated) DEVICE — SU VICRYL 4-0 FS-1 27" J441H

## (undated) DEVICE — ESU GROUND PAD UNIVERSAL W/O CORD

## (undated) DEVICE — BLADE CLIPPER 4406

## (undated) DEVICE — SOL NACL 0.9% IRRIG 1000ML BOTTLE 2F7124

## (undated) DEVICE — DRSG TELFA ISLAND 4X10"

## (undated) DEVICE — LINEN C-SECTION 5415

## (undated) DEVICE — PREP CHLORAPREP 26ML TINTED HI-LITE ORANGE 930815

## (undated) DEVICE — SU VICRYL 0 CT 36" J358H

## (undated) DEVICE — PACK C-SECTION LF PL15OTA83B

## (undated) RX ORDER — MORPHINE SULFATE 1 MG/ML
INJECTION, SOLUTION EPIDURAL; INTRATHECAL; INTRAVENOUS
Status: DISPENSED
Start: 2023-07-28

## (undated) RX ORDER — CHLOROPROCAINE HYDROCHLORIDE 30 MG/ML
INJECTION, SOLUTION EPIDURAL; INFILTRATION; INTRACAUDAL; PERINEURAL
Status: DISPENSED
Start: 2023-07-28